# Patient Record
Sex: FEMALE | Race: WHITE | NOT HISPANIC OR LATINO | Employment: OTHER | URBAN - METROPOLITAN AREA
[De-identification: names, ages, dates, MRNs, and addresses within clinical notes are randomized per-mention and may not be internally consistent; named-entity substitution may affect disease eponyms.]

---

## 2020-05-07 ENCOUNTER — TELEPHONE (OUTPATIENT)
Dept: NEUROLOGY | Facility: MEDICAL CENTER | Age: 75
End: 2020-05-07

## 2020-05-07 ENCOUNTER — APPOINTMENT (OUTPATIENT)
Dept: RADIOLOGY | Facility: MEDICAL CENTER | Age: 75
DRG: 037 | End: 2020-05-07
Attending: EMERGENCY MEDICINE
Payer: MEDICARE

## 2020-05-07 ENCOUNTER — HOSPITAL ENCOUNTER (OUTPATIENT)
Dept: RADIOLOGY | Facility: MEDICAL CENTER | Age: 75
End: 2020-05-07
Payer: MEDICARE

## 2020-05-07 ENCOUNTER — APPOINTMENT (OUTPATIENT)
Dept: RADIOLOGY | Facility: MEDICAL CENTER | Age: 75
DRG: 037 | End: 2020-05-07
Attending: HOSPITALIST
Payer: MEDICARE

## 2020-05-07 ENCOUNTER — HOSPITAL ENCOUNTER (INPATIENT)
Facility: MEDICAL CENTER | Age: 75
LOS: 5 days | DRG: 037 | End: 2020-05-12
Attending: EMERGENCY MEDICINE | Admitting: HOSPITALIST
Payer: MEDICARE

## 2020-05-07 ENCOUNTER — APPOINTMENT (OUTPATIENT)
Dept: CARDIOLOGY | Facility: MEDICAL CENTER | Age: 75
DRG: 037 | End: 2020-05-07
Attending: HOSPITALIST
Payer: MEDICARE

## 2020-05-07 DIAGNOSIS — R42 VERTIGO: ICD-10-CM

## 2020-05-07 DIAGNOSIS — I63.9 ACUTE CVA (CEREBROVASCULAR ACCIDENT) (HCC): ICD-10-CM

## 2020-05-07 DIAGNOSIS — I63.9 CEREBROVASCULAR ACCIDENT (CVA), UNSPECIFIED MECHANISM (HCC): ICD-10-CM

## 2020-05-07 PROBLEM — Z72.0 TOBACCO ABUSE: Status: ACTIVE | Noted: 2020-05-07

## 2020-05-07 PROBLEM — E87.6 HYPOKALEMIA: Status: ACTIVE | Noted: 2020-05-07

## 2020-05-07 PROBLEM — I10 HTN (HYPERTENSION): Status: ACTIVE | Noted: 2020-05-07

## 2020-05-07 PROBLEM — I65.29 CAROTID STENOSIS: Status: ACTIVE | Noted: 2020-05-07

## 2020-05-07 PROBLEM — I67.1 INTRACRANIAL ANEURYSM: Status: ACTIVE | Noted: 2020-05-07

## 2020-05-07 PROBLEM — E78.5 HLD (HYPERLIPIDEMIA): Status: ACTIVE | Noted: 2020-05-07

## 2020-05-07 PROBLEM — E43 SEVERE PROTEIN-CALORIE MALNUTRITION (HCC): Status: ACTIVE | Noted: 2020-05-07

## 2020-05-07 LAB
ABO + RH BLD: NORMAL
ABO GROUP BLD: NORMAL
ALBUMIN SERPL BCP-MCNC: 2.7 G/DL (ref 3.2–4.9)
ALBUMIN/GLOB SERPL: 1.3 G/DL
ALP SERPL-CCNC: 80 U/L (ref 30–99)
ALT SERPL-CCNC: 11 U/L (ref 2–50)
ANION GAP SERPL CALC-SCNC: 8 MMOL/L (ref 7–16)
APTT PPP: 35.8 SEC (ref 24.7–36)
AST SERPL-CCNC: 11 U/L (ref 12–45)
BASOPHILS # BLD AUTO: 0.6 % (ref 0–1.8)
BASOPHILS # BLD: 0.04 K/UL (ref 0–0.12)
BILIRUB SERPL-MCNC: 0.7 MG/DL (ref 0.1–1.5)
BLD GP AB SCN SERPL QL: NORMAL
BUN SERPL-MCNC: 11 MG/DL (ref 8–22)
CALCIUM SERPL-MCNC: 7.1 MG/DL (ref 8.5–10.5)
CHLORIDE SERPL-SCNC: 115 MMOL/L (ref 96–112)
CO2 SERPL-SCNC: 20 MMOL/L (ref 20–33)
CREAT SERPL-MCNC: 0.53 MG/DL (ref 0.5–1.4)
EOSINOPHIL # BLD AUTO: 0.06 K/UL (ref 0–0.51)
EOSINOPHIL NFR BLD: 0.9 % (ref 0–6.9)
ERYTHROCYTE [DISTWIDTH] IN BLOOD BY AUTOMATED COUNT: 42.4 FL (ref 35.9–50)
GLOBULIN SER CALC-MCNC: 2.1 G/DL (ref 1.9–3.5)
GLUCOSE SERPL-MCNC: 86 MG/DL (ref 65–99)
HCT VFR BLD AUTO: 37.9 % (ref 37–47)
HGB BLD-MCNC: 12.3 G/DL (ref 12–16)
IMM GRANULOCYTES # BLD AUTO: 0.01 K/UL (ref 0–0.11)
IMM GRANULOCYTES NFR BLD AUTO: 0.2 % (ref 0–0.9)
INR PPP: 1.21 (ref 0.87–1.13)
LYMPHOCYTES # BLD AUTO: 1.63 K/UL (ref 1–4.8)
LYMPHOCYTES NFR BLD: 25.4 % (ref 22–41)
MCH RBC QN AUTO: 27.9 PG (ref 27–33)
MCHC RBC AUTO-ENTMCNC: 32.5 G/DL (ref 33.6–35)
MCV RBC AUTO: 85.9 FL (ref 81.4–97.8)
MONOCYTES # BLD AUTO: 0.51 K/UL (ref 0–0.85)
MONOCYTES NFR BLD AUTO: 8 % (ref 0–13.4)
NEUTROPHILS # BLD AUTO: 4.16 K/UL (ref 2–7.15)
NEUTROPHILS NFR BLD: 64.9 % (ref 44–72)
NRBC # BLD AUTO: 0 K/UL
NRBC BLD-RTO: 0 /100 WBC
PLATELET # BLD AUTO: 139 K/UL (ref 164–446)
PMV BLD AUTO: 10.4 FL (ref 9–12.9)
POTASSIUM SERPL-SCNC: 3.1 MMOL/L (ref 3.6–5.5)
PREALB SERPL-MCNC: 12.3 MG/DL (ref 18–38)
PROT SERPL-MCNC: 4.8 G/DL (ref 6–8.2)
PROTHROMBIN TIME: 15.6 SEC (ref 12–14.6)
RBC # BLD AUTO: 4.41 M/UL (ref 4.2–5.4)
RH BLD: NORMAL
SODIUM SERPL-SCNC: 143 MMOL/L (ref 135–145)
TROPONIN T SERPL-MCNC: 7 NG/L (ref 6–19)
WBC # BLD AUTO: 6.4 K/UL (ref 4.8–10.8)

## 2020-05-07 PROCEDURE — 84484 ASSAY OF TROPONIN QUANT: CPT

## 2020-05-07 PROCEDURE — 86900 BLOOD TYPING SEROLOGIC ABO: CPT

## 2020-05-07 PROCEDURE — 770020 HCHG ROOM/CARE - TELE (206)

## 2020-05-07 PROCEDURE — 99223 1ST HOSP IP/OBS HIGH 75: CPT | Mod: 25 | Performed by: HOSPITALIST

## 2020-05-07 PROCEDURE — 96365 THER/PROPH/DIAG IV INF INIT: CPT

## 2020-05-07 PROCEDURE — 71045 X-RAY EXAM CHEST 1 VIEW: CPT

## 2020-05-07 PROCEDURE — 84134 ASSAY OF PREALBUMIN: CPT

## 2020-05-07 PROCEDURE — 70450 CT HEAD/BRAIN W/O DYE: CPT

## 2020-05-07 PROCEDURE — 700111 HCHG RX REV CODE 636 W/ 250 OVERRIDE (IP): Performed by: HOSPITALIST

## 2020-05-07 PROCEDURE — 700117 HCHG RX CONTRAST REV CODE 255: Performed by: EMERGENCY MEDICINE

## 2020-05-07 PROCEDURE — 80053 COMPREHEN METABOLIC PANEL: CPT

## 2020-05-07 PROCEDURE — 700105 HCHG RX REV CODE 258: Performed by: HOSPITALIST

## 2020-05-07 PROCEDURE — 83036 HEMOGLOBIN GLYCOSYLATED A1C: CPT

## 2020-05-07 PROCEDURE — 36415 COLL VENOUS BLD VENIPUNCTURE: CPT

## 2020-05-07 PROCEDURE — 85610 PROTHROMBIN TIME: CPT

## 2020-05-07 PROCEDURE — 86901 BLOOD TYPING SEROLOGIC RH(D): CPT

## 2020-05-07 PROCEDURE — 99407 BEHAV CHNG SMOKING > 10 MIN: CPT | Performed by: HOSPITALIST

## 2020-05-07 PROCEDURE — 86850 RBC ANTIBODY SCREEN: CPT

## 2020-05-07 PROCEDURE — 85730 THROMBOPLASTIN TIME PARTIAL: CPT

## 2020-05-07 PROCEDURE — 70498 CT ANGIOGRAPHY NECK: CPT

## 2020-05-07 PROCEDURE — 94760 N-INVAS EAR/PLS OXIMETRY 1: CPT

## 2020-05-07 PROCEDURE — 70496 CT ANGIOGRAPHY HEAD: CPT

## 2020-05-07 PROCEDURE — 99358 PROLONG SERVICE W/O CONTACT: CPT | Mod: 25 | Performed by: HOSPITALIST

## 2020-05-07 PROCEDURE — 0042T CT-CEREBRAL PERFUSION ANALYSIS: CPT

## 2020-05-07 PROCEDURE — 99285 EMERGENCY DEPT VISIT HI MDM: CPT

## 2020-05-07 PROCEDURE — 85025 COMPLETE CBC W/AUTO DIFF WBC: CPT

## 2020-05-07 RX ORDER — LOSARTAN POTASSIUM 50 MG/1
50 TABLET ORAL DAILY
COMMUNITY

## 2020-05-07 RX ORDER — AMOXICILLIN 250 MG
2 CAPSULE ORAL 2 TIMES DAILY
Status: DISCONTINUED | OUTPATIENT
Start: 2020-05-07 | End: 2020-05-12 | Stop reason: HOSPADM

## 2020-05-07 RX ORDER — ONDANSETRON 2 MG/ML
4 INJECTION INTRAMUSCULAR; INTRAVENOUS EVERY 4 HOURS PRN
Status: DISCONTINUED | OUTPATIENT
Start: 2020-05-07 | End: 2020-05-12 | Stop reason: HOSPADM

## 2020-05-07 RX ORDER — METOPROLOL SUCCINATE 25 MG/1
25 TABLET, EXTENDED RELEASE ORAL DAILY
COMMUNITY

## 2020-05-07 RX ORDER — POLYETHYLENE GLYCOL 3350 17 G/17G
1 POWDER, FOR SOLUTION ORAL
Status: DISCONTINUED | OUTPATIENT
Start: 2020-05-07 | End: 2020-05-12 | Stop reason: HOSPADM

## 2020-05-07 RX ORDER — POTASSIUM CHLORIDE 7.45 MG/ML
10 INJECTION INTRAVENOUS
Status: COMPLETED | OUTPATIENT
Start: 2020-05-07 | End: 2020-05-08

## 2020-05-07 RX ORDER — ASPIRIN 300 MG/1
300 SUPPOSITORY RECTAL DAILY
Status: DISCONTINUED | OUTPATIENT
Start: 2020-05-08 | End: 2020-05-11

## 2020-05-07 RX ORDER — ASPIRIN 81 MG/1
324 TABLET, CHEWABLE ORAL DAILY
Status: DISCONTINUED | OUTPATIENT
Start: 2020-05-08 | End: 2020-05-11

## 2020-05-07 RX ORDER — VERAPAMIL HYDROCHLORIDE 120 MG/1
40 TABLET, FILM COATED ORAL DAILY
COMMUNITY

## 2020-05-07 RX ORDER — SODIUM CHLORIDE 9 MG/ML
INJECTION, SOLUTION INTRAVENOUS CONTINUOUS
Status: DISCONTINUED | OUTPATIENT
Start: 2020-05-07 | End: 2020-05-09

## 2020-05-07 RX ORDER — BISACODYL 10 MG
10 SUPPOSITORY, RECTAL RECTAL
Status: DISCONTINUED | OUTPATIENT
Start: 2020-05-07 | End: 2020-05-12 | Stop reason: HOSPADM

## 2020-05-07 RX ORDER — LABETALOL HYDROCHLORIDE 5 MG/ML
10 INJECTION, SOLUTION INTRAVENOUS EVERY 4 HOURS PRN
Status: DISCONTINUED | OUTPATIENT
Start: 2020-05-07 | End: 2020-05-09

## 2020-05-07 RX ORDER — ATORVASTATIN CALCIUM 40 MG/1
40 TABLET, FILM COATED ORAL EVERY EVENING
Status: DISCONTINUED | OUTPATIENT
Start: 2020-05-07 | End: 2020-05-12 | Stop reason: HOSPADM

## 2020-05-07 RX ORDER — HYDRALAZINE HYDROCHLORIDE 20 MG/ML
10 INJECTION INTRAMUSCULAR; INTRAVENOUS
Status: DISCONTINUED | OUTPATIENT
Start: 2020-05-07 | End: 2020-05-09

## 2020-05-07 RX ORDER — ONDANSETRON 4 MG/1
4 TABLET, ORALLY DISINTEGRATING ORAL EVERY 4 HOURS PRN
Status: DISCONTINUED | OUTPATIENT
Start: 2020-05-07 | End: 2020-05-12 | Stop reason: HOSPADM

## 2020-05-07 RX ORDER — ASPIRIN 325 MG
325 TABLET ORAL DAILY
Status: DISCONTINUED | OUTPATIENT
Start: 2020-05-08 | End: 2020-05-11

## 2020-05-07 RX ADMIN — IOHEXOL 40 ML: 350 INJECTION, SOLUTION INTRAVENOUS at 20:49

## 2020-05-07 RX ADMIN — POTASSIUM CHLORIDE 10 MEQ: 10 INJECTION, SOLUTION INTRAVENOUS at 23:57

## 2020-05-07 RX ADMIN — POTASSIUM CHLORIDE 10 MEQ: 10 INJECTION, SOLUTION INTRAVENOUS at 21:51

## 2020-05-07 RX ADMIN — SODIUM CHLORIDE: 9 INJECTION, SOLUTION INTRAVENOUS at 23:57

## 2020-05-07 RX ADMIN — IOHEXOL 80 ML: 350 INJECTION, SOLUTION INTRAVENOUS at 20:50

## 2020-05-07 SDOH — ECONOMIC STABILITY: FOOD INSECURITY: WITHIN THE PAST 12 MONTHS, THE FOOD YOU BOUGHT JUST DIDN'T LAST AND YOU DIDN'T HAVE MONEY TO GET MORE.: NEVER TRUE

## 2020-05-07 SDOH — ECONOMIC STABILITY: FOOD INSECURITY: WITHIN THE PAST 12 MONTHS, YOU WORRIED THAT YOUR FOOD WOULD RUN OUT BEFORE YOU GOT MONEY TO BUY MORE.: NEVER TRUE

## 2020-05-07 SDOH — HEALTH STABILITY: MENTAL HEALTH: HOW OFTEN DO YOU HAVE A DRINK CONTAINING ALCOHOL?: NEVER

## 2020-05-07 SDOH — ECONOMIC STABILITY: TRANSPORTATION INSECURITY
IN THE PAST 12 MONTHS, HAS THE LACK OF TRANSPORTATION KEPT YOU FROM MEDICAL APPOINTMENTS OR FROM GETTING MEDICATIONS?: NO

## 2020-05-07 SDOH — ECONOMIC STABILITY: TRANSPORTATION INSECURITY
IN THE PAST 12 MONTHS, HAS LACK OF TRANSPORTATION KEPT YOU FROM MEETINGS, WORK, OR FROM GETTING THINGS NEEDED FOR DAILY LIVING?: NO

## 2020-05-07 ASSESSMENT — LIFESTYLE VARIABLES
SUBSTANCE_ABUSE: 0
EVER FELT BAD OR GUILTY ABOUT YOUR DRINKING: NO
ALCOHOL_USE: NO
AVERAGE NUMBER OF DAYS PER WEEK YOU HAVE A DRINK CONTAINING ALCOHOL: 0
HAVE PEOPLE ANNOYED YOU BY CRITICIZING YOUR DRINKING: NO
HOW MANY TIMES IN THE PAST YEAR HAVE YOU HAD 5 OR MORE DRINKS IN A DAY: 0
CONSUMPTION TOTAL: NEGATIVE
HAVE YOU EVER FELT YOU SHOULD CUT DOWN ON YOUR DRINKING: NO
TOTAL SCORE: 0
ON A TYPICAL DAY WHEN YOU DRINK ALCOHOL HOW MANY DRINKS DO YOU HAVE: 0
EVER_SMOKED: YES
TOTAL SCORE: 0
TOTAL SCORE: 0
EVER HAD A DRINK FIRST THING IN THE MORNING TO STEADY YOUR NERVES TO GET RID OF A HANGOVER: NO

## 2020-05-07 ASSESSMENT — COPD QUESTIONNAIRES
DURING THE PAST 4 WEEKS HOW MUCH DID YOU FEEL SHORT OF BREATH: NONE/LITTLE OF THE TIME
COPD SCREENING SCORE: 5
DO YOU EVER COUGH UP ANY MUCUS OR PHLEGM?: YES, A FEW DAYS A WEEK OR MONTH
HAVE YOU SMOKED AT LEAST 100 CIGARETTES IN YOUR ENTIRE LIFE: YES
IN THE PAST 12 MONTHS DO YOU DO LESS THAN YOU USED TO BECAUSE OF YOUR BREATHING PROBLEMS: DISAGREE/UNSURE

## 2020-05-07 ASSESSMENT — ENCOUNTER SYMPTOMS
DIZZINESS: 1
DOUBLE VISION: 0
FEVER: 0
HEMOPTYSIS: 0
BRUISES/BLEEDS EASILY: 0
VOMITING: 0
HALLUCINATIONS: 0
FOCAL WEAKNESS: 0
NAUSEA: 0
PALPITATIONS: 0
WEAKNESS: 0
SHORTNESS OF BREATH: 0
CHILLS: 0
DEPRESSION: 0
COUGH: 0
ABDOMINAL PAIN: 0
MYALGIAS: 0
HEARTBURN: 0
EYE DISCHARGE: 0
SPEECH CHANGE: 1
FLANK PAIN: 0
BLURRED VISION: 0
SENSORY CHANGE: 1

## 2020-05-07 ASSESSMENT — PAIN SCALES - WONG BAKER: WONGBAKER_NUMERICALRESPONSE: DOESN'T HURT AT ALL

## 2020-05-07 ASSESSMENT — COGNITIVE AND FUNCTIONAL STATUS - GENERAL
STANDING UP FROM CHAIR USING ARMS: A LITTLE
SUGGESTED CMS G CODE MODIFIER DAILY ACTIVITY: CI
CLIMB 3 TO 5 STEPS WITH RAILING: A LITTLE
WALKING IN HOSPITAL ROOM: A LITTLE
DAILY ACTIVITIY SCORE: 23
MOBILITY SCORE: 19
SUGGESTED CMS G CODE MODIFIER MOBILITY: CK
MOVING FROM LYING ON BACK TO SITTING ON SIDE OF FLAT BED: A LITTLE
MOVING TO AND FROM BED TO CHAIR: A LITTLE
TOILETING: A LITTLE

## 2020-05-07 ASSESSMENT — PATIENT HEALTH QUESTIONNAIRE - PHQ9
2. FEELING DOWN, DEPRESSED, IRRITABLE, OR HOPELESS: NOT AT ALL
1. LITTLE INTEREST OR PLEASURE IN DOING THINGS: NOT AT ALL
SUM OF ALL RESPONSES TO PHQ9 QUESTIONS 1 AND 2: 0

## 2020-05-07 ASSESSMENT — FIBROSIS 4 INDEX: FIB4 SCORE: 1.81

## 2020-05-08 ENCOUNTER — APPOINTMENT (OUTPATIENT)
Dept: RADIOLOGY | Facility: MEDICAL CENTER | Age: 75
DRG: 037 | End: 2020-05-08
Attending: HOSPITALIST
Payer: MEDICARE

## 2020-05-08 ENCOUNTER — APPOINTMENT (OUTPATIENT)
Dept: RADIOLOGY | Facility: MEDICAL CENTER | Age: 75
DRG: 037 | End: 2020-05-08
Attending: PSYCHIATRY & NEUROLOGY
Payer: MEDICARE

## 2020-05-08 LAB
ALBUMIN SERPL BCP-MCNC: 3.3 G/DL (ref 3.2–4.9)
ALBUMIN/GLOB SERPL: 1.5 G/DL
ALP SERPL-CCNC: 100 U/L (ref 30–99)
ALT SERPL-CCNC: 13 U/L (ref 2–50)
ANION GAP SERPL CALC-SCNC: 8 MMOL/L (ref 7–16)
AST SERPL-CCNC: 16 U/L (ref 12–45)
BASOPHILS # BLD AUTO: 0.6 % (ref 0–1.8)
BASOPHILS # BLD: 0.04 K/UL (ref 0–0.12)
BILIRUB SERPL-MCNC: 1 MG/DL (ref 0.1–1.5)
BUN SERPL-MCNC: 11 MG/DL (ref 8–22)
CALCIUM SERPL-MCNC: 9 MG/DL (ref 8.5–10.5)
CHLORIDE SERPL-SCNC: 104 MMOL/L (ref 96–112)
CHOLEST SERPL-MCNC: 165 MG/DL (ref 100–199)
CO2 SERPL-SCNC: 24 MMOL/L (ref 20–33)
CREAT SERPL-MCNC: 0.75 MG/DL (ref 0.5–1.4)
EOSINOPHIL # BLD AUTO: 0.1 K/UL (ref 0–0.51)
EOSINOPHIL NFR BLD: 1.4 % (ref 0–6.9)
ERYTHROCYTE [DISTWIDTH] IN BLOOD BY AUTOMATED COUNT: 43.4 FL (ref 35.9–50)
EST. AVERAGE GLUCOSE BLD GHB EST-MCNC: 108 MG/DL
GLOBULIN SER CALC-MCNC: 2.2 G/DL (ref 1.9–3.5)
GLUCOSE SERPL-MCNC: 96 MG/DL (ref 65–99)
HBA1C MFR BLD: 5.4 % (ref 0–5.6)
HCT VFR BLD AUTO: 42.1 % (ref 37–47)
HDLC SERPL-MCNC: 36 MG/DL
HGB BLD-MCNC: 13.8 G/DL (ref 12–16)
IMM GRANULOCYTES # BLD AUTO: 0.02 K/UL (ref 0–0.11)
IMM GRANULOCYTES NFR BLD AUTO: 0.3 % (ref 0–0.9)
LDLC SERPL CALC-MCNC: 107 MG/DL
LV EJECT FRACT  99904: 70
LYMPHOCYTES # BLD AUTO: 2.12 K/UL (ref 1–4.8)
LYMPHOCYTES NFR BLD: 29.2 % (ref 22–41)
MCH RBC QN AUTO: 28 PG (ref 27–33)
MCHC RBC AUTO-ENTMCNC: 32.8 G/DL (ref 33.6–35)
MCV RBC AUTO: 85.6 FL (ref 81.4–97.8)
MONOCYTES # BLD AUTO: 0.72 K/UL (ref 0–0.85)
MONOCYTES NFR BLD AUTO: 9.9 % (ref 0–13.4)
NEUTROPHILS # BLD AUTO: 4.26 K/UL (ref 2–7.15)
NEUTROPHILS NFR BLD: 58.6 % (ref 44–72)
NRBC # BLD AUTO: 0 K/UL
NRBC BLD-RTO: 0 /100 WBC
PLATELET # BLD AUTO: 158 K/UL (ref 164–446)
PMV BLD AUTO: 10.2 FL (ref 9–12.9)
POTASSIUM SERPL-SCNC: 3.8 MMOL/L (ref 3.6–5.5)
PROT SERPL-MCNC: 5.5 G/DL (ref 6–8.2)
RBC # BLD AUTO: 4.92 M/UL (ref 4.2–5.4)
SODIUM SERPL-SCNC: 136 MMOL/L (ref 135–145)
TRIGL SERPL-MCNC: 108 MG/DL (ref 0–149)
WBC # BLD AUTO: 7.3 K/UL (ref 4.8–10.8)

## 2020-05-08 PROCEDURE — 36415 COLL VENOUS BLD VENIPUNCTURE: CPT

## 2020-05-08 PROCEDURE — 92610 EVALUATE SWALLOWING FUNCTION: CPT

## 2020-05-08 PROCEDURE — 93306 TTE W/DOPPLER COMPLETE: CPT | Mod: 26 | Performed by: INTERNAL MEDICINE

## 2020-05-08 PROCEDURE — 99222 1ST HOSP IP/OBS MODERATE 55: CPT | Mod: GC | Performed by: PSYCHIATRY & NEUROLOGY

## 2020-05-08 PROCEDURE — 93306 TTE W/DOPPLER COMPLETE: CPT

## 2020-05-08 PROCEDURE — 700111 HCHG RX REV CODE 636 W/ 250 OVERRIDE (IP): Performed by: HOSPITALIST

## 2020-05-08 PROCEDURE — 700102 HCHG RX REV CODE 250 W/ 637 OVERRIDE(OP): Performed by: HOSPITALIST

## 2020-05-08 PROCEDURE — 97165 OT EVAL LOW COMPLEX 30 MIN: CPT

## 2020-05-08 PROCEDURE — 93880 EXTRACRANIAL BILAT STUDY: CPT

## 2020-05-08 PROCEDURE — 770020 HCHG ROOM/CARE - TELE (206)

## 2020-05-08 PROCEDURE — 85025 COMPLETE CBC W/AUTO DIFF WBC: CPT

## 2020-05-08 PROCEDURE — 99233 SBSQ HOSP IP/OBS HIGH 50: CPT | Performed by: HOSPITALIST

## 2020-05-08 PROCEDURE — 80053 COMPREHEN METABOLIC PANEL: CPT

## 2020-05-08 PROCEDURE — 70551 MRI BRAIN STEM W/O DYE: CPT

## 2020-05-08 PROCEDURE — 97162 PT EVAL MOD COMPLEX 30 MIN: CPT

## 2020-05-08 PROCEDURE — 700105 HCHG RX REV CODE 258: Performed by: HOSPITALIST

## 2020-05-08 PROCEDURE — 80061 LIPID PANEL: CPT

## 2020-05-08 PROCEDURE — A9270 NON-COVERED ITEM OR SERVICE: HCPCS | Performed by: HOSPITALIST

## 2020-05-08 RX ORDER — ACETAMINOPHEN 325 MG/1
650 TABLET ORAL EVERY 6 HOURS PRN
Status: DISCONTINUED | OUTPATIENT
Start: 2020-05-08 | End: 2020-05-12 | Stop reason: HOSPADM

## 2020-05-08 RX ADMIN — ATORVASTATIN CALCIUM 40 MG: 40 TABLET, FILM COATED ORAL at 17:24

## 2020-05-08 RX ADMIN — POTASSIUM CHLORIDE 10 MEQ: 10 INJECTION, SOLUTION INTRAVENOUS at 01:29

## 2020-05-08 RX ADMIN — ASPIRIN 325 MG: 325 TABLET, FILM COATED ORAL at 09:59

## 2020-05-08 RX ADMIN — SENNOSIDES AND DOCUSATE SODIUM 2 TABLET: 8.6; 5 TABLET ORAL at 17:24

## 2020-05-08 RX ADMIN — NICOTINE 7 MG: 7 PATCH TRANSDERMAL at 04:52

## 2020-05-08 RX ADMIN — SODIUM CHLORIDE: 9 INJECTION, SOLUTION INTRAVENOUS at 07:55

## 2020-05-08 RX ADMIN — POTASSIUM CHLORIDE 10 MEQ: 10 INJECTION, SOLUTION INTRAVENOUS at 03:16

## 2020-05-08 ASSESSMENT — ENCOUNTER SYMPTOMS
SENSORY CHANGE: 0
DEPRESSION: 0
ORTHOPNEA: 0
NECK PAIN: 0
MYALGIAS: 0
SPUTUM PRODUCTION: 0
WEAKNESS: 1
PALPITATIONS: 0
HEARTBURN: 0
FEVER: 0
BACK PAIN: 0
DIZZINESS: 1
PHOTOPHOBIA: 0
BLURRED VISION: 0
NERVOUS/ANXIOUS: 0
DOUBLE VISION: 0
WEIGHT LOSS: 0
CHILLS: 0
ABDOMINAL PAIN: 0
VOMITING: 0
CLAUDICATION: 0
HEMOPTYSIS: 0
NAUSEA: 0
COUGH: 0
EYE PAIN: 0

## 2020-05-08 ASSESSMENT — COGNITIVE AND FUNCTIONAL STATUS - GENERAL
WALKING IN HOSPITAL ROOM: A LITTLE
MOVING TO AND FROM BED TO CHAIR: A LITTLE
DAILY ACTIVITIY SCORE: 23
MOBILITY SCORE: 19
SUGGESTED CMS G CODE MODIFIER DAILY ACTIVITY: CI
SUGGESTED CMS G CODE MODIFIER MOBILITY: CK
STANDING UP FROM CHAIR USING ARMS: A LITTLE
DRESSING REGULAR LOWER BODY CLOTHING: A LITTLE
MOVING FROM LYING ON BACK TO SITTING ON SIDE OF FLAT BED: A LITTLE
CLIMB 3 TO 5 STEPS WITH RAILING: A LITTLE

## 2020-05-08 ASSESSMENT — GAIT ASSESSMENTS
DEVIATION: DECREASED BASE OF SUPPORT;OTHER (COMMENT)
DISTANCE (FEET): 110
GAIT LEVEL OF ASSIST: MINIMAL ASSIST

## 2020-05-08 ASSESSMENT — ACTIVITIES OF DAILY LIVING (ADL): TOILETING: INDEPENDENT

## 2020-05-08 NOTE — PROGRESS NOTES
[facility] Medicine Daily Progress Note    Date of Service  5/8/2020    Chief Complaint  74 y.o. female admitted 5/7/2020 with dizziness      Interval Problem Update  aWaiting MRI of the head    She is still dizzy    She is afebrile    Denies any headache    Carotid ultrasound shows greater than 70% of the left internal carotid artery      Consultants/Specialty  Neurology    Code Status    Full code  Disposition  Pending    Review of Systems  Review of Systems   Constitutional: Positive for malaise/fatigue. Negative for chills, fever and weight loss.   HENT: Negative for ear discharge, ear pain, hearing loss and tinnitus.    Eyes: Negative for blurred vision, double vision, photophobia and pain.   Respiratory: Negative for cough, hemoptysis and sputum production.    Cardiovascular: Negative for chest pain, palpitations, orthopnea and claudication.   Gastrointestinal: Negative for abdominal pain, heartburn, nausea and vomiting.   Genitourinary: Negative for dysuria, frequency and urgency.   Musculoskeletal: Negative for back pain, joint pain, myalgias and neck pain.   Neurological: Positive for dizziness and weakness. Negative for sensory change.   Psychiatric/Behavioral: Negative for depression and suicidal ideas. The patient is not nervous/anxious.         Physical Exam  Temp:  [36.7 °C (98 °F)-36.8 °C (98.2 °F)] 36.7 °C (98 °F)  Pulse:  [45-57] 57  Resp:  [16-26] 20  BP: (145-203)/(59-93) 145/59  SpO2:  [92 %-97 %] 94 %    Physical Exam  Constitutional:       General: She is not in acute distress.     Appearance: She is not ill-appearing, toxic-appearing or diaphoretic.      Comments: Thin   HENT:      Head: Normocephalic and atraumatic.      Nose: No rhinorrhea.      Mouth/Throat:      Pharynx: No posterior oropharyngeal erythema.   Eyes:      General: No scleral icterus.     Extraocular Movements: Extraocular movements intact.      Pupils: Pupils are equal, round, and reactive to light.   Neck:       Musculoskeletal: Normal range of motion and neck supple.   Cardiovascular:      Rate and Rhythm: Normal rate and regular rhythm.      Pulses: Normal pulses.      Heart sounds: No murmur.   Pulmonary:      Effort: Pulmonary effort is normal. No respiratory distress.      Breath sounds: No stridor. No wheezing, rhonchi or rales.   Chest:      Chest wall: No tenderness.   Abdominal:      General: Abdomen is flat. There is no distension.      Palpations: There is no mass.      Tenderness: There is no abdominal tenderness. There is no guarding or rebound.      Hernia: No hernia is present.   Musculoskeletal: Normal range of motion.         General: No swelling, tenderness, deformity or signs of injury.      Right lower leg: No edema.      Left lower leg: No edema.   Skin:     General: Skin is warm.      Capillary Refill: Capillary refill takes 2 to 3 seconds.      Findings: No bruising or lesion.   Neurological:      General: No focal deficit present.      Mental Status: She is alert and oriented to person, place, and time.   Psychiatric:      Comments: Depressed mood         Fluids  No intake or output data in the 24 hours ending 05/08/20 0851    Laboratory  Recent Labs     05/07/20 2036 05/08/20  0407   WBC 6.4 7.3   RBC 4.41 4.92   HEMOGLOBIN 12.3 13.8   HEMATOCRIT 37.9 42.1   MCV 85.9 85.6   MCH 27.9 28.0   MCHC 32.5* 32.8*   RDW 42.4 43.4   PLATELETCT 139* 158*   MPV 10.4 10.2     Recent Labs     05/07/20 2036 05/08/20  0407   SODIUM 143 136   POTASSIUM 3.1* 3.8   CHLORIDE 115* 104   CO2 20 24   GLUCOSE 86 96   BUN 11 11   CREATININE 0.53 0.75   CALCIUM 7.1* 9.0     Recent Labs     05/07/20 2036   APTT 35.8   INR 1.21*         Recent Labs     05/08/20  0407   TRIGLYCERIDE 108   HDL 36*   *       Imaging  EC-ECHOCARDIOGRAM COMPLETE W/O CONT   Final Result      US-CAROTID DOPPLER BILAT   Final Result      OUTSIDE IMAGES-CT HEAD   Final Result      DX-CHEST-PORTABLE (1 VIEW)   Final Result      No acute  cardiopulmonary abnormality.      CT-CTA NECK WITH & W/O-POST PROCESSING   Final Result   Addendum 1 of 1   Neuro interventional radiology consult is advised for potential aneurysm    intervention.      Final      CT-CTA HEAD WITH & W/O-POST PROCESS   Final Result      1.  No large vessel occlusion or high-grade stenosis.   2.  A 3.5 mm aneurysm at the left MCA trifurcation.      CT-CEREBRAL PERFUSION ANALYSIS   Final Result      1.  Cerebral blood flow less than 30% likely representing completed infarct = 0 mL.      2.  T Max more than 6 seconds likely representing combination of completed infarct and ischemia = 0 mL.      3.  Mismatched volume likely representing ischemic brain/penumbra = 0 mL.      4.  Please note that the cerebral perfusion was performed on the limited brain tissue around the basal ganglia region. Infarct/ischemia outside the CT perfusion sections can be missed in this study.      CT-HEAD W/O   Final Result      1. No CT evidence of acute infarct, hemorrhage or mass.   2. Mild global parenchymal atrophy. Chronic small vessel ischemic changes.      MR-BRAIN-W/O    (Results Pending)        Assessment/Plan  * CVA (cerebral vascular accident) (HCC)  Assessment & Plan  Dizziness with persistent vertigo   Admit to neuro   Tele monitoring  Echocardiogram   CTA head and neck with evidence of left carotid stenosis 50-70%, small aneurysm on CTA head  Mri brain w/o ordered  Echocardiogram  A1c normal   lipid panel ldl 107  Statin and asa   Permissive htn   Neuro consulted    Severe protein-calorie malnutrition (HCC)- (present on admission)  Assessment & Plan  Protein supplementation  Nutrition consultation     Hypokalemia- (present on admission)  Assessment & Plan  Replace and recheck     Intracranial aneurysm- (present on admission)  Assessment & Plan  will need follow up    Tobacco abuse  Assessment & Plan  Previously advised to stop smoking    HLD (hyperlipidemia)- (present on admission)  Assessment &  Plan  High dose statin therapy       Carotid stenosis- (present on admission)  Assessment & Plan  Left ICA stenosis  Await MRI head and consult vascular surgery if needed     HTN (hypertension)- (present on admission)  Assessment & Plan  Permissive htn as clinically appropriate        VTE prophylaxis: scd    Check am cbc, bmp

## 2020-05-08 NOTE — PROGRESS NOTES
0615: When completing MRI screening sheet, patient informed this RN she recently had surgery on L retina, patient unsure of what surgery was performed and if anything was implanted. Per patient, daughter in law Rachel will be able to inform nursing about surgery. Rachel's number found in paper chart, number added to emergency contact list. Will pass onto day shift RN to contact Rachel this AM.

## 2020-05-08 NOTE — CARE PLAN
Problem: Nutritional:  Goal: Achieve adequate nutritional intake  Description: Patient will consume 50% of meals/supplements   Outcome: NOT MET     See RD note

## 2020-05-08 NOTE — PROGRESS NOTES
1000: Report received from ASHWIN Sanchez    1020: Assumed care of pt with ASHWIN Bates. Pt retrieved from ED. Transported to floor via gurney. Pt reporting pain at IV site. Denies numbness and tingling. Reporting dizziness and blurred vision that has been occurring for the last month but is worse today. Reports daughter in law took blood pressure earlier in day at home, BP was elevated, prompting them to go to the hospital.     1035: Ambulated from rney to bed with slightly unsteady gait, reporting dizziness continues. Dysphagia screen failed for slurred speech. Pt oriented to room, use of call light, and fall risk. Pt understanding of teaching. Call light and belongings within reach, nonskid socks on, bed alarm on, SCDs on, all needs met at this time.

## 2020-05-08 NOTE — DIETARY
"Nutrition services: Day 1 of admit.  Yeny Kirk is a 74 y.o. female with admitting DX of Dysarthria, CVA     Consult received for low BMI    Phone call to pt, no answer. Pt does not have cell phone with her per family who answered cell when called. Visit with pt at bedside: pt reports  lb. Pt reports fair appetite, eating 2 small meals/day PTA x1 month. Pt reports eating all of lunch today. Pt agreeable to supplement (Boost Plus or Magic Cup) with meals to encourage weight gain. Pt reports drinking Boost type shakes at home when daughter brings them. Pt reports noticeable muscle loss in arms (thin/flat).      Assessment:  Height: 162.6 cm (5' 4\")  Weight: 49.1 kg (108 lb 3.9 oz)  Body mass index is 18.58 kg/m²., BMI classification: Normal  Diet/Intake: Puree (Level 4) with thick liquids (Level 2). 50-75% breakfast today    Evaluation:   1. Negative nutrition admit screen for poor PO and weight loss  2. PMH: Severe PCM per hospital problems  3. Labs: PAB 12.3 (5/7)  4. Meds: Lipitor   5. Fluids: NS @ 50 ml/hr  6. Noted severe fat/severe muscle loss (orbital/temporal/dorsal regions)    Malnutrition Risk: Pt with severe malnutrition evident by poor PO intake <75% x1 month, severe fat/severe muscle loss (orbital/temporal/dorsal regions, thin/flat limbs).     Recommendations/Plan:  1. Boost Plus or magic cup with meals   2. Encourage intake of meals  3. Document intake of all meals as % taken in ADL's to provide interdisciplinary communication across all shifts.   4. Monitor weight.  5. Nutrition rep will continue to see patient for ongoing meal and snack preferences.     RD following         "

## 2020-05-08 NOTE — ED TRIAGE NOTES
Yeny Kirk  74 y.o.  female  Chief Complaint   Patient presents with   • Possible Stroke     brought in by ambulance transferred from [facility] for possible stroke. patient c/o dizziness and blurred vision x month but today having trouble walking due to severe dizziness. + nausea. right facial dropp and having hard time articulating. =  and strong.

## 2020-05-08 NOTE — THERAPY
"Occupational Therapy Evaluation completed.   Functional Status:   Currently min A with ADLs and ADL transfers. Pt currently limited by decreased functional mobility,  Balance, which are affecting pt's ability to complete ADLs/IADLs at baseline. Pt would benefit from OT services in the acute care setting to maximize functional recovery.   Plan of Care: Will benefit from Occupational Therapy 4 times per week  Discharge Recommendations:  Recommend post-acute placement for additional occupational therapy services prior to discharge home.  See \"Rehab Therapy-Acute\" Patient Summary Report for complete documentation.    "

## 2020-05-08 NOTE — ED NOTES
Patient brought to room via gurney from CT. RN bedside report received from ASHWIN Real. Patient's blood pressure is elevated, ERP aware. Neuro exam is unremarkable other than very mild dysarthria and dizziness. No needs at this time.

## 2020-05-08 NOTE — CARE PLAN
Problem: Communication  Goal: The ability to communicate needs accurately and effectively will improve  Outcome: PROGRESSING AS EXPECTED  Intervention: [place] patient and significant other/support system to call light to alert staff of needs  Flowsheets (Taken 5/8/2020 1457)  Oriented to:: All of the Following : Location of Bathroom, Visiting Policy, Unit Routine, Call Light and Bedside Controls, Bedside Rail Policy, Smoking Policy, Rights and Responsibilities, Bedside Report, and Patient Education Notebook  Note: Pt A&Ox4, calls appropriately using call light and is able to make needs known to staff. Pt oriented to room and call light. Hourly rounding in place.        Problem: Safety  Goal: Will remain free from falls  Outcome: PROGRESSING AS EXPECTED  Intervention: Implement fall precautions  Flowsheets (Taken 5/8/2020 0800)  Environmental Precautions:   Treaded Slipper Socks on Patient   Personal Belongings, Wastebasket, Call Bell etc. in Easy Reach   Transferred to Stronger Side   Report Given to Other Health Care Providers Regarding Fall Risk   Bed in Low Position   Communication Sign for Patients & Families   Mobility Assessed & Appropriate Sign Placed  Note: Pt is a moderate fall risk. Fall precautions in place. Bed locked and in lowest position. Bed alarm on, call light within reach, non skid socks in place.

## 2020-05-08 NOTE — THERAPY
"Speech Language Therapy Clinical Swallow Evaluation completed.    Functional Status: Patient was seen on this date for a clinical swallow evaluation. Patient was AAOx4 with dysarthric speech characterized by reduced rate. Oral motor examination revealed minimal right facial droop, slow and reduced lingual movements, and mild-moderately impaired diadochokinetic rates. PO trials were administered and consisted of single ice chips, MTL, pudding, thin liquids, and soft solids. Onset of swallow trigger was minimally delayed and laryngeal elevation adequate to palpation. Patient had mild anterior bolus loss x1 with MTL and ~50% of the time with trials of thin liquids concerning for adequate bolus control. No overt s/sx of aspiration appreciated this session. Mastication prolonged with soft peaches and resulted in mild-moderate oral residue.     Recommendations - Diet: At this time, recommend initiation of a Level 4 (pureed), Level 2 (mildly think liquid) diet given direct supervision. OK for patient to have single sips of un-thickened water between meals. MRI pending - SLP following.                             Strategies: Direct supervision during meals, No Straws and Head of Bed at 90 Degrees                            Medication Administration: Whole in puree/pudding     Plan of Care: Will benefit from Speech Therapy 5 times per week    Post-Acute Therapy: Recommend inpatient transitional care services for continued speech therapy services.      See \"Rehab Therapy-Acute\" Patient Summary Report for complete documentation. Thank you for the consult.           "

## 2020-05-08 NOTE — THERAPY
"Physical Therapy Evaluation completed.   Bed Mobility:  Supine to Sit: Modified Independent  Transfers: Sit to Stand: Contact Guard Assist  Gait: Level Of Assist: Minimal Assist (min A without covering L eye; CGA when L eye covered) with No Equipment Needed       Plan of Care: Will benefit from Physical Therapy 4 times per week  Discharge Recommendations: Equipment: Will Continue to Assess for Equipment Needs. See below    Pt presents to PT with impaired balance and gait associated with medical co-morbidities. Her current visual distrubances are notably impacting her gait mechanics and balance. She does improve her gait mechanics and balance when covering L eye to reduce \"double vision\" and would recommend use of eye patch at L eye during OOB activity given current presentation. However she does demonstrate abnormal gait mechanics (even with eye covering) and has almost scissoring gait at times and is currently at increased risk for falls without additional PT and/or appropriate compensatory strategies. Currently would recommend continued skilled PT/placement at time of medical clearance for optimal functional recovery though anticiptae that pt will quickly progress with habituation to L eye covering (or resolution of current symptoms) and increased OOB activity with both PT and staff. WIll continue to visit and assist with dc planning.       See \"Rehab Therapy-Acute\" Patient Summary Report for complete documentation.     "

## 2020-05-08 NOTE — PROGRESS NOTES
Monitor Summary: SR 49-54, TX .20, QRS .10, QT .44 with rare PVCs and heart rate as low as 40bpm per strip from monitor room.

## 2020-05-08 NOTE — PROGRESS NOTES
2 RN skin check performed with ASHWIN Dyson.  Generalized bruising/scabbing, skin fragile.   No skin issues noted.

## 2020-05-08 NOTE — PROGRESS NOTES
74-year-old female with history of slurred speech, dizziness and right-sided facial droop. The CT angiogram of the head demonstrates an approximately 2 to 3 mm sized left middle cerebral bifurcation aneurysm. The superior M2 segment is seen arising from the base of the aneurysm. The aneurysm hs a wide neck. There is also an approximately 70% stenosis at the origin of the right internal carotid artery.              Neuro interventional recommendations:  Left middle cerebral artery aneurysm:  The 5 year risk of rupture rate of this aneurysm is very low. Observation is recommended. One-year CT angiogram of the head is recommended to ensure the stability.    Left internal carotid artery origin stenosis:    Vascular surgery consultation is recommended for carotid revascularization. Since the carotid stenting procedure needs Plavix and aspirin for 6 months, carotid endarterectomy may be less risky than stenting  in the presence of cerebral aneurysm.

## 2020-05-08 NOTE — CONSULTS
Called by Dr. Quintana     73 y/o male presenting as transfer from [facility] for dizziness. Had acute onset vertigo at rest and severe nystagmus. Patient was transferred here for concern for possible stroke. CTA Head/Neck were performed. No LVO on CTA head but moderate to severe stenosis of the LICA on the CTA neck. According to Dr. Quintana patient is having some slurred speech or possibly aphasia. In this regard admission to the hospital would be warranted, and an MR Brain W/O CST as well as a carotid doppler would be indicated. If the carotid doppler confirms the degree of stenosis, and the MRI shows infarction then this would constitute a symptomatic carotid and vascular surgery may be indicated.     Plan:   -Admission to hospital.  -MR Brain W/O CST  -Carotid doppler  -If Carotid doppler confirms moderate-severe stenosis and MR Brain W/O CST shows infarction then consult vascular surgery.  -Full neuro consult in the morning.

## 2020-05-08 NOTE — ED NOTES
Medicated patient per MAR, held PO meds pending swallow evaluation.    Called report to ASHWIN Dyson for the patient going to room S193-21

## 2020-05-08 NOTE — H&P
Hospital Medicine History & Physical Note    Date of Service  5/7/2020    Primary Care Physician  No primary care provider on file.    Code Status  full    Chief Complaint  Chief Complaint   Patient presents with   • Possible Stroke     brought in by ambulance transferred from [facility] for possible stroke. patient c/o dizziness and blurred vision x month but today having trouble walking due to severe dizziness. + nausea. right facial dropp and having hard time articulating. =  and strong.       History of Presenting Illness  74 y.o. female who presented 5/7/2020 with past medical history of hypertension, tobacco abuse, history of retinal hemorrhage of the left eye presents with dizziness.  This patient presents with severe dizziness.  She presented to the outside hospital with significant dizziness and uncontrolled blood pressure.  Felt like she was falling backwards whenever she moves or changes her head position.  She did have some mild nausea and had difficulty walking with ataxia.  She also started developing dysarthria at the outside hospital concerning for possible CVA.  The patient woke up with difficulty talking especially with the right side of her mouth and it has not improved substantially otherwise she has no known alleviating or exacerbating factors to her symptoms.  To be admitted to the hospital for further CVA rule out.    Per review of outside hospital records patient's blood pressure 170/62 respiratory rate 20 heart rate 57 temperature 36.5 °C WBC count 8.6 hemoglobin 15.8 platelet count 178 sodium 140 potassium 3.9 chloride 104 CO2 25 alkaline phosphatase 114 AST 20 ALT 15 BUN 16 glucose 103 creatinine 1.7 calcium 9.7 albumin 4.2 CT of the head images demonstrate there is no acute intra-axial or extra-axial hemorrhage.  There is no shift of midline structures.  The bony calvarium is intact.  No intracranial changes.        Review of Systems  Review of Systems   Constitutional:  Positive for malaise/fatigue. Negative for chills and fever.   HENT: Negative for congestion, hearing loss and tinnitus.    Eyes: Negative for blurred vision, double vision and discharge.   Respiratory: Negative for cough, hemoptysis and shortness of breath.    Cardiovascular: Negative for chest pain, palpitations and leg swelling.   Gastrointestinal: Negative for abdominal pain, heartburn, nausea and vomiting.   Genitourinary: Negative for dysuria and flank pain.   Musculoskeletal: Negative for joint pain and myalgias.   Skin: Negative for rash.   Neurological: Positive for dizziness, sensory change and speech change. Negative for focal weakness and weakness.   Endo/Heme/Allergies: Negative for environmental allergies. Does not bruise/bleed easily.   Psychiatric/Behavioral: Negative for depression, hallucinations and substance abuse.       Past Medical History   has a past medical history of Hypertension.    Surgical History  Reviewed and not pertinent    Family History  Reviewed and not pertinent     Social History   reports that she has been smoking cigarettes. She has been smoking about 0.30 packs per day. She has never used smokeless tobacco. She reports that she does not drink alcohol or use drugs.    Allergies  Allergies   Allergen Reactions   • Dust Mite Extract    • Pollen Extract        Medications  None       Physical Exam  Temp:  [36.7 °C (98.1 °F)] 36.7 °C (98.1 °F)  Pulse:  [56] 56  Resp:  [16] 16  BP: (198)/(87) 198/87  SpO2:  [95 %] 95 %    Physical Exam  Vitals signs reviewed.   Constitutional:       Appearance: Normal appearance.   HENT:      Head: Normocephalic and atraumatic.      Nose: No congestion.      Mouth/Throat:      Mouth: Mucous membranes are dry.   Eyes:      Extraocular Movements: Extraocular movements intact.      Pupils: Pupils are equal, round, and reactive to light.   Neck:      Musculoskeletal: Normal range of motion and neck supple. No muscular tenderness.   Cardiovascular:       Rate and Rhythm: Normal rate and regular rhythm.      Pulses: Normal pulses.      Heart sounds: Normal heart sounds. No murmur.   Pulmonary:      Effort: Pulmonary effort is normal. No respiratory distress.      Breath sounds: Normal breath sounds. No stridor.   Abdominal:      General: Bowel sounds are normal. There is no distension.      Palpations: Abdomen is soft.      Tenderness: There is no abdominal tenderness.   Musculoskeletal:         General: No swelling or deformity.   Skin:     General: Skin is warm and dry.      Capillary Refill: Capillary refill takes 2 to 3 seconds.   Neurological:      General: No focal deficit present.      Mental Status: She is alert and oriented to person, place, and time.      Comments: Dysarthria    Psychiatric:         Mood and Affect: Mood normal.         Behavior: Behavior normal.         Thought Content: Thought content normal.         Judgment: Judgment normal.         Laboratory:  Recent Labs     05/07/20 2036   WBC 6.4   RBC 4.41   HEMOGLOBIN 12.3   HEMATOCRIT 37.9   MCV 85.9   MCH 27.9   MCHC 32.5*   RDW 42.4   PLATELETCT 139*   MPV 10.4         No results for input(s): ALTSGPT, ASTSGOT, ALKPHOSPHAT, TBILIRUBIN, DBILIRUBIN, GAMMAGT, AMYLASE, LIPASE, ALB, PREALBUMIN, GLUCOSE in the last 72 hours.  Recent Labs     05/07/20 2036   APTT 35.8   INR 1.21*     No results for input(s): NTPROBNP in the last 72 hours.      No results for input(s): TROPONINT in the last 72 hours.    Imaging:  CT-CTA NECK WITH & W/O-POST PROCESSING   Final Result      1.  Moderate to severe, 50-70% stenosis of the left ICA origin.   2.  No other high-grade stenosis. No aneurysm or dissection.      CT-CTA HEAD WITH & W/O-POST PROCESS   Final Result      1.  No large vessel occlusion or high-grade stenosis.   2.  A 3.5 mm aneurysm at the left MCA trifurcation.      CT-CEREBRAL PERFUSION ANALYSIS   Final Result      1.  Cerebral blood flow less than 30% likely representing completed infarct = 0 mL.       2.  T Max more than 6 seconds likely representing combination of completed infarct and ischemia = 0 mL.      3.  Mismatched volume likely representing ischemic brain/penumbra = 0 mL.      4.  Please note that the cerebral perfusion was performed on the limited brain tissue around the basal ganglia region. Infarct/ischemia outside the CT perfusion sections can be missed in this study.      CT-HEAD W/O   Final Result      1. No CT evidence of acute infarct, hemorrhage or mass.   2. Mild global parenchymal atrophy. Chronic small vessel ischemic changes.      DX-CHEST-PORTABLE (1 VIEW)    (Results Pending)         Assessment/Plan:  Anticipate greater than 2 midnight hospitalization     * CVA (cerebral vascular accident) (HCC)  Assessment & Plan  Dizziness with persistent vertigo   Admit to neuro   Tele monitoring  Echocardiogram   CTA head and neck with evidence of left carotid stenosis 50-70%, small aneurysm on CTA head  Mri brain w/o ordered  Echocardiogram  A1c, lipid panel   Statin and asa   Permissive htn   Neuro consulted plan for eval in am     Severe protein-calorie malnutrition (HCC)  Assessment & Plan  Check prealbumin   Nutrition consultation     Hypokalemia  Assessment & Plan  Replace and recheck     Intracranial aneurysm  Assessment & Plan  will need follow up consider IR intervention in am     Tobacco abuse  Assessment & Plan  Greater than 10 minutes spent with patient smoking cessation counseling. Discussed cardiovascular risk factors of smoking. Nicotine patch provided to patient.     HLD (hyperlipidemia)  Assessment & Plan  High dose statin therapy   Lipid panel     Carotid stenosis  Assessment & Plan  Left ICA stenosis  May need vascular intervention as she is symptomatic     HTN (hypertension)  Assessment & Plan  Permissive htn as clinically appropriate     Ppx: SCD    I spent a total of 31 minutes of non face to face time performing additional research, reviewing old medical records, provided on  going management by following up on labs, placing orders, discussing plan of care with other healthcare providers and nursing staff. Start time: 9:00 pm. End time: 9:31 pm

## 2020-05-08 NOTE — PROGRESS NOTES
2215: Patients , Ian, updated on patients arrival to unit.  provided with unit phone number and patients room number. Updated on plan of care, all questions answered at this time.     2200: Patient c/o IV potassium burning while infusing. IV potassium infusing at tolerable rate per patient report at 60 ml/hr with normal saline infusion. Will increase rate to ordered rate as tolerated.

## 2020-05-08 NOTE — ED PROVIDER NOTES
ED Provider Note    CHIEF COMPLAINT  Chief Complaint   Patient presents with   • Possible Stroke     brought in by ambulance transferred from [facility] for possible stroke. patient c/o dizziness and blurred vision x month but today having trouble walking due to severe dizziness. + nausea. right facial dropp and having hard time articulating. =  and strong.       HPI  Yeny Kirk is a 74 y.o. female who presents as a transfer for a possible acute CVA.  Around noon the patient had some dizziness as well as nausea.  She was noted to be hypertensive and was taken in for evaluation at [facility].  She states with her dizziness she feels like she was falling backwards.  She does not describe vertigo.  She is unaware of any focal weakness.  She did have some dysarthria that was noted at the transfer facility.  Otherwise no other neurologic deficits noted.  The patient states she has not had any recent fevers.  She is also found to be significantly hypertensive.  She had a CT scan that was negative.  Due to the dysarthria she was sent for further evaluation for acute CVA.  Of note the patient did receive Nitropaste initially for her hypertension and is causing severe dizziness.  They did attempt meclizine with no success.  They also attempted losartan 50 mg to treat her blood pressure.    REVIEW OF SYSTEMS  See HPI for further details. All other systems are negative.     PAST MEDICAL HISTORY  No past medical history on file.    FAMILY HISTORY  [unfilled]    SOCIAL HISTORY  Social History     Socioeconomic History   • Marital status: Not on file     Spouse name: Not on file   • Number of children: Not on file   • Years of education: Not on file   • Highest education level: Not on file   Occupational History   • Not on file   Social Needs   • Financial resource strain: Not on file   • Food insecurity     Worry: Not on file     Inability: Not on file   • Transportation needs     Medical: Not on  "file     Non-medical: Not on file   Tobacco Use   • Smoking status: Not on file   Substance and Sexual Activity   • Alcohol use: Not on file   • Drug use: Not on file   • Sexual activity: Not on file   Lifestyle   • Physical activity     Days per week: Not on file     Minutes per session: Not on file   • Stress: Not on file   Relationships   • Social connections     Talks on phone: Not on file     Gets together: Not on file     Attends Protestant service: Not on file     Active member of club or organization: Not on file     Attends meetings of clubs or organizations: Not on file     Relationship status: Not on file   • Intimate partner violence     Fear of current or ex partner: Not on file     Emotionally abused: Not on file     Physically abused: Not on file     Forced sexual activity: Not on file   Other Topics Concern   • Not on file   Social History Narrative   • Not on file       SURGICAL HISTORY  No past surgical history on file.    CURRENT MEDICATIONS  Home Medications    **Home medications have not yet been reviewed for this encounter**         ALLERGIES  Allergies   Allergen Reactions   • Dust Mite Extract    • Pollen Extract        PHYSICAL EXAM  VITAL SIGNS: BP (!) 198/87   Pulse (!) 56   Temp 36.7 °C (98.1 °F) (Temporal)   Resp 16   Ht 1.626 m (5' 4\")   Wt 51.3 kg (113 lb)   SpO2 95%   BMI 19.40 kg/m²       Constitutional: N acute distress, Non-toxic appearance.   HENT: Normocephalic, Atraumatic, Bilateral external ears normal, Oropharynx moist, No oral exudates, Nose normal.   Eyes: PERRLA, EOMI, Conjunctiva normal, No discharge.   Neck: Normal range of motion, No tenderness, Supple, No stridor.   Lymphatic: No lymphadenopathy noted.   Cardiovascular: Normal heart rate, Normal rhythm, No murmurs, No rubs, No gallops.   Thorax & Lungs: Normal breath sounds, No respiratory distress, No wheezing, No chest tenderness.   Abdomen: Bowel sounds normal, Soft, No tenderness, No masses, No pulsatile " masses.   Skin: Warm, Dry, No erythema, No rash.   Back: No tenderness, No CVA tenderness.    Normal sphincter tone. No external or internal lesions noted. Stool is normal color and heme negative.   Extremities: Intact distal pulses, No edema, No tenderness, No cyanosis, No clubbing.   Neurologic: NIH stroke scale of 2 with 1 point for dysarthria and 1 for aphasia is is difficult to determine if he is truly dysarthric versus aphasic.  Psychiatric: Affect normal, Judgment normal, Mood normal.       RADIOLOGY/PROCEDURES  OUTSIDE IMAGES-CT HEAD   Final Result      CT-CTA NECK WITH & W/O-POST PROCESSING   Final Result   Addendum 1 of 1   Neuro interventional radiology consult is advised for potential aneurysm    intervention.      Final      CT-CTA HEAD WITH & W/O-POST PROCESS   Final Result      1.  No large vessel occlusion or high-grade stenosis.   2.  A 3.5 mm aneurysm at the left MCA trifurcation.      CT-CEREBRAL PERFUSION ANALYSIS   Final Result      1.  Cerebral blood flow less than 30% likely representing completed infarct = 0 mL.      2.  T Max more than 6 seconds likely representing combination of completed infarct and ischemia = 0 mL.      3.  Mismatched volume likely representing ischemic brain/penumbra = 0 mL.      4.  Please note that the cerebral perfusion was performed on the limited brain tissue around the basal ganglia region. Infarct/ischemia outside the CT perfusion sections can be missed in this study.      CT-HEAD W/O   Final Result      1. No CT evidence of acute infarct, hemorrhage or mass.   2. Mild global parenchymal atrophy. Chronic small vessel ischemic changes.      DX-CHEST-PORTABLE (1 VIEW)    (Results Pending)   MR-BRAIN-W/O    (Results Pending)   MR-BRAIN-W/O    (Results Pending)   EC-ECHOCARDIOGRAM COMPLETE W/O CONT    (Results Pending)         COURSE & MEDICAL DECISION MAKING  Pertinent Labs & Imaging studies reviewed. (See chart for details)  This a 74-year-old female who presents to  the emergency department with concerning symptoms for a acute CVA.  The last time she was normal was around noon therefore she is not a candidate for TPA.  She would be a candidate for interventional treatment and therefore a CT angiogram as well as perfusion studies were ordered.  CT angios does show a 3 mm aneurysm but no change in flow nor large vessel lesion.  I spoke with the neurologist he did notice some occlusion of the internal carotid artery and he wants an MRI and if there is any evidence of a stroke vascular surgery will be consulted.  We will help her miss of hypertension as we do not cause more of a deficit.  On repeat exam her exam is unchanged and she continues have an NIH stroke scale of 2.  I did review her EKG from the transfer facility does not show any evidence of arrhythmia.  Laboratory analysis was reviewed as well and there is no significant metabolic derangements.    FINAL IMPRESSION  1.  Acute CVA  2.  Hypertension  3.  Critical care time 30 minutes         Electronically signed by: Eliezer Quintana M.D., 5/7/2020 9:08 PM

## 2020-05-08 NOTE — ED NOTES
Patient transported by two ACLS RN's via gurney. All belongings removed from the room and patient's home medications sent to pharmacy via medication bag and documented.

## 2020-05-08 NOTE — CARE PLAN
Problem: Acute Care of the Stroke Patient  Goal: Optimal Outcome for the Stroke patient  Outcome: PROGRESSING AS EXPECTED  Note: Vitals, NIHSS, education, completed. updated pt on plan of care        Problem: Safety  Goal: Free from accidental injury  Outcome: PROGRESSING AS EXPECTED  Note: Fall precautions completed, fall education given; pt accepting of teaching. Pt using call light appropriately.

## 2020-05-08 NOTE — TELEPHONE ENCOUNTER
Called by Transfer Center     [facility]  Dr. Monsalve     74 year old smoker with multiple vascular risk factors presenting with acute onset nonremitting vertigo, present at rest, with associated severe nystagmus (possibly direction changing) on ERP exam at OSH.    Patient LKN >4.5 hr from time of call.    Ddx includes posterior circulation stroke; transfer to [facility] to be expedited.  Upon arrival to ED, advised to call a STROKE IR with expedited CTA imaging.    Please page neurology upon patient arrival for additional recommendations and full consultation.     Maxwell Allen MD  Director, Comprehensive Stroke Center, [facility]  Neurohospitalist, [facility]  Clinical  of Neurology, [employer] [facility] [facility] [employer]  t) [phone] (f) [phone]

## 2020-05-08 NOTE — PROGRESS NOTES
Neurology Addendum in Care Planning    Case discussed with Dr. Jordy Ibarra via telephone call.    Recommendations are as follows:    1. L MCA aneurism  - manage conservatively  - repeat vessel imaging in 1yr to monitor for stability    2. L ICA asymptomatic stenosis of the vessel origin  - consult vascular surgery to determine if pt. Is a candidate for nonurgent intervention    Recommendations relayed to Dr. Mauricio Daily via phone call.  Please see full progress note authored by Dr. Luly Fallon earlier today with my attestation for additional details.    Maxwell Allen MD  Director, Comprehensive Stroke Center, [facility]  Neurohospitalist, [facility]  Clinical  of Neurology, [facility] [employer]  t) [phone] (f) [phone]

## 2020-05-09 PROBLEM — R42 VERTIGO: Status: ACTIVE | Noted: 2020-05-07

## 2020-05-09 LAB
ANION GAP SERPL CALC-SCNC: 9 MMOL/L (ref 7–16)
BUN SERPL-MCNC: 15 MG/DL (ref 8–22)
CALCIUM SERPL-MCNC: 9.1 MG/DL (ref 8.5–10.5)
CHLORIDE SERPL-SCNC: 107 MMOL/L (ref 96–112)
CO2 SERPL-SCNC: 22 MMOL/L (ref 20–33)
COVID ORDER STATUS COVID19: NORMAL
CREAT SERPL-MCNC: 0.75 MG/DL (ref 0.5–1.4)
EKG IMPRESSION: NORMAL
ERYTHROCYTE [DISTWIDTH] IN BLOOD BY AUTOMATED COUNT: 42.2 FL (ref 35.9–50)
GLUCOSE SERPL-MCNC: 106 MG/DL (ref 65–99)
HCT VFR BLD AUTO: 41.4 % (ref 37–47)
HGB BLD-MCNC: 13.9 G/DL (ref 12–16)
MCH RBC QN AUTO: 28.3 PG (ref 27–33)
MCHC RBC AUTO-ENTMCNC: 33.6 G/DL (ref 33.6–35)
MCV RBC AUTO: 84.1 FL (ref 81.4–97.8)
PLATELET # BLD AUTO: 145 K/UL (ref 164–446)
PMV BLD AUTO: 10.2 FL (ref 9–12.9)
POTASSIUM SERPL-SCNC: 4 MMOL/L (ref 3.6–5.5)
RBC # BLD AUTO: 4.92 M/UL (ref 4.2–5.4)
SARS-COV-2 RNA RESP QL NAA+PROBE: NOTDETECTED
SODIUM SERPL-SCNC: 138 MMOL/L (ref 135–145)
SPECIMEN SOURCE: NORMAL
WBC # BLD AUTO: 5.7 K/UL (ref 4.8–10.8)

## 2020-05-09 PROCEDURE — 700111 HCHG RX REV CODE 636 W/ 250 OVERRIDE (IP): Performed by: HOSPITALIST

## 2020-05-09 PROCEDURE — 700102 HCHG RX REV CODE 250 W/ 637 OVERRIDE(OP): Performed by: HOSPITALIST

## 2020-05-09 PROCEDURE — A9270 NON-COVERED ITEM OR SERVICE: HCPCS | Performed by: HOSPITALIST

## 2020-05-09 PROCEDURE — 93005 ELECTROCARDIOGRAM TRACING: CPT | Performed by: SURGERY

## 2020-05-09 PROCEDURE — U0004 COV-19 TEST NON-CDC HGH THRU: HCPCS

## 2020-05-09 PROCEDURE — 85027 COMPLETE CBC AUTOMATED: CPT

## 2020-05-09 PROCEDURE — 93010 ELECTROCARDIOGRAM REPORT: CPT | Performed by: INTERNAL MEDICINE

## 2020-05-09 PROCEDURE — 770020 HCHG ROOM/CARE - TELE (206)

## 2020-05-09 PROCEDURE — 99233 SBSQ HOSP IP/OBS HIGH 50: CPT | Performed by: HOSPITALIST

## 2020-05-09 PROCEDURE — 700105 HCHG RX REV CODE 258: Performed by: HOSPITALIST

## 2020-05-09 PROCEDURE — G2023 SPECIMEN COLLECT COVID-19: HCPCS | Performed by: SURGERY

## 2020-05-09 PROCEDURE — 80048 BASIC METABOLIC PNL TOTAL CA: CPT

## 2020-05-09 PROCEDURE — 36415 COLL VENOUS BLD VENIPUNCTURE: CPT

## 2020-05-09 RX ORDER — VERAPAMIL HYDROCHLORIDE 80 MG/1
40 TABLET ORAL DAILY
Status: DISCONTINUED | OUTPATIENT
Start: 2020-05-09 | End: 2020-05-12 | Stop reason: HOSPADM

## 2020-05-09 RX ORDER — CLOPIDOGREL BISULFATE 75 MG/1
75 TABLET ORAL DAILY
Status: DISCONTINUED | OUTPATIENT
Start: 2020-05-09 | End: 2020-05-12 | Stop reason: HOSPADM

## 2020-05-09 RX ORDER — SCOLOPAMINE TRANSDERMAL SYSTEM 1 MG/1
1 PATCH, EXTENDED RELEASE TRANSDERMAL
Status: DISCONTINUED | OUTPATIENT
Start: 2020-05-09 | End: 2020-05-12 | Stop reason: HOSPADM

## 2020-05-09 RX ORDER — METOPROLOL SUCCINATE 25 MG/1
25 TABLET, EXTENDED RELEASE ORAL DAILY
Status: DISCONTINUED | OUTPATIENT
Start: 2020-05-09 | End: 2020-05-12 | Stop reason: HOSPADM

## 2020-05-09 RX ORDER — LABETALOL HYDROCHLORIDE 5 MG/ML
10 INJECTION, SOLUTION INTRAVENOUS EVERY 4 HOURS PRN
Status: DISCONTINUED | OUTPATIENT
Start: 2020-05-09 | End: 2020-05-12 | Stop reason: HOSPADM

## 2020-05-09 RX ORDER — HYDRALAZINE HYDROCHLORIDE 20 MG/ML
10 INJECTION INTRAMUSCULAR; INTRAVENOUS
Status: DISCONTINUED | OUTPATIENT
Start: 2020-05-09 | End: 2020-05-12 | Stop reason: HOSPADM

## 2020-05-09 RX ORDER — LOSARTAN POTASSIUM 50 MG/1
50 TABLET ORAL DAILY
Status: DISCONTINUED | OUTPATIENT
Start: 2020-05-09 | End: 2020-05-12 | Stop reason: HOSPADM

## 2020-05-09 RX ADMIN — LOSARTAN POTASSIUM 50 MG: 50 TABLET, FILM COATED ORAL at 13:00

## 2020-05-09 RX ADMIN — MAGNESIUM HYDROXIDE 30 ML: 400 SUSPENSION ORAL at 17:58

## 2020-05-09 RX ADMIN — CLOPIDOGREL BISULFATE 75 MG: 75 TABLET ORAL at 13:03

## 2020-05-09 RX ADMIN — VERAPAMIL HYDROCHLORIDE 40 MG: 80 TABLET, FILM COATED ORAL at 14:33

## 2020-05-09 RX ADMIN — ASPIRIN 325 MG: 325 TABLET, FILM COATED ORAL at 04:28

## 2020-05-09 RX ADMIN — SCOPALAMINE 1 PATCH: 1 PATCH, EXTENDED RELEASE TRANSDERMAL at 14:34

## 2020-05-09 RX ADMIN — HYDRALAZINE HYDROCHLORIDE 10 MG: 20 INJECTION INTRAMUSCULAR; INTRAVENOUS at 20:36

## 2020-05-09 RX ADMIN — SENNOSIDES AND DOCUSATE SODIUM 2 TABLET: 8.6; 5 TABLET ORAL at 17:58

## 2020-05-09 RX ADMIN — ACETAMINOPHEN 650 MG: 325 TABLET, FILM COATED ORAL at 00:04

## 2020-05-09 RX ADMIN — METOPROLOL SUCCINATE 25 MG: 25 TABLET, EXTENDED RELEASE ORAL at 13:00

## 2020-05-09 RX ADMIN — NICOTINE 7 MG: 7 PATCH TRANSDERMAL at 04:28

## 2020-05-09 RX ADMIN — ATORVASTATIN CALCIUM 40 MG: 40 TABLET, FILM COATED ORAL at 17:58

## 2020-05-09 RX ADMIN — HYDRALAZINE HYDROCHLORIDE 10 MG: 20 INJECTION INTRAMUSCULAR; INTRAVENOUS at 15:42

## 2020-05-09 RX ADMIN — SENNOSIDES AND DOCUSATE SODIUM 2 TABLET: 8.6; 5 TABLET ORAL at 04:28

## 2020-05-09 RX ADMIN — ACETAMINOPHEN 650 MG: 325 TABLET, FILM COATED ORAL at 21:45

## 2020-05-09 RX ADMIN — SODIUM CHLORIDE: 9 INJECTION, SOLUTION INTRAVENOUS at 06:40

## 2020-05-09 ASSESSMENT — ENCOUNTER SYMPTOMS
DOUBLE VISION: 0
NAUSEA: 0
BACK PAIN: 0
VOMITING: 0
COUGH: 0
WEIGHT LOSS: 0
MYALGIAS: 0
HEARTBURN: 0
CLAUDICATION: 0
CHILLS: 0
NERVOUS/ANXIOUS: 0
SENSORY CHANGE: 0
BLURRED VISION: 0
WEAKNESS: 1
ABDOMINAL PAIN: 0
EYE PAIN: 0
PALPITATIONS: 0
HEMOPTYSIS: 0
FEVER: 0
ORTHOPNEA: 0
SPUTUM PRODUCTION: 0
NECK PAIN: 0
PHOTOPHOBIA: 0
DEPRESSION: 0
DIZZINESS: 1

## 2020-05-09 ASSESSMENT — PATIENT HEALTH QUESTIONNAIRE - PHQ9
1. LITTLE INTEREST OR PLEASURE IN DOING THINGS: NOT AT ALL
SUM OF ALL RESPONSES TO PHQ9 QUESTIONS 1 AND 2: 0
2. FEELING DOWN, DEPRESSED, IRRITABLE, OR HOPELESS: NOT AT ALL

## 2020-05-09 ASSESSMENT — FIBROSIS 4 INDEX: FIB4 SCORE: 2.13

## 2020-05-09 NOTE — PROGRESS NOTES
Hospitalist (Dr. Daily) rounded on pt at bedside. Per MD d/c fluids. MD additionally notified that 's. No further orders at this time.

## 2020-05-09 NOTE — CONSULTS
DATE OF CONSULTATION: 5/9/2020     REFERRING PHYSICIAN: MD Killian.     CONSULTING PHYSICIAN: Lino Oswald M.D.      REASON FOR CONSULTATION: Evaluate patient with symptomatic left carotid artery stenosis.       HISTORY OF PRESENT ILLNESS: The patient is a 74-year-old female who presented to the hospital 2 days ago with visual changes in her left eye and dizziness.  She apparently had some episodes of difficulty speaking.  Patient still has persistent visual issues going on in the left eye but neurologically otherwise is intact with equal strength in upper and lower extremities.  Work-up was initiated which included a CTA which demonstrated significant left carotid artery stenosis.  There is also a small intracranial aneurysm.  Patient underwent catheter directed angiography which confirmed the presence of the aneurysm and the stenosis.    PAST MEDICAL HISTORY:  has a past medical history of Hypertension.     PAST SURGICAL HISTORY: patient denies any surgical history     ALLERGIES:   Allergies   Allergen Reactions   • Dust Mite Extract    • Pollen Extract         CURRENT MEDICATIONS:   Home Medications     Reviewed by Jah Monroe (Pharmacy Tech) on 05/07/20 at 2125  Med List Status: Unable to Obtain   Medication Last Dose Status   losartan (COZAAR) 50 MG Tab  Active   metoprolol SR (TOPROL XL) 25 MG TABLET SR 24 HR  Active   verapamil (ISOPTIN) 120 MG Tab  Active                FAMILY HISTORY:   Family History   Problem Relation Age of Onset   • Diabetes Sister         SOCIAL HISTORY:   Social History     Tobacco Use   • Smoking status: Current Every Day Smoker     Packs/day: 0.30     Types: Cigarettes   • Smokeless tobacco: Never Used   Substance and Sexual Activity   • Alcohol use: Never     Frequency: Never   • Drug use: Never   • Sexual activity: Not on file       Review of Systems:      Constitutional: Reports dizziness   eyes: Visual changes left eye  Ears/Nose/Throat/Mouth: Denies nasal congestion  or sore throat   Cardiovascular: Denies chest pain or palpitations.  Respiratory: Denies shortness of breath, cough, and wheezing.  Gastrointestinal/Hepatic: Denies abdominal pain, nausea, vomiting, diarrhea, constipation or GI bleeding   Genitourinary: Denies dysuria or frequency  Musculoskeletal/Rheum: Denies  joint pain and swelling, no edema  Skin: Denies rash  Neurological: Denies headache, confusion, memory loss or focal weakness/parasthesias  Psychiatric: denies mood disorder   Endocrine: Isabel thyroid problems  Heme/Oncology/Lymph Nodes: Denies enlarged lymph nodes, denies brusing or known bleeding disorder  All other systems were reviewed and are negative (AMA/CMS criteria)                  PHYSICAL EXAMINATION:       Constitutional:   Elderly thin female   HENMT:  Normocephalic, Atraumatic, Oropharynx moist mucous membranes, No oral exudates, Nose normal.  No thyromegaly.  Eyes:  EOMI, Conjunctiva normal, No discharge.  Neck:  Normal range of motion, No cervical tenderness,  no JVD.  Cardiovascular:  Normal heart rate, Normal rhythm, No murmurs, No rubs, No gallops.   Extremitites with intact distal pulses, no cyanosis, or edema.  Lungs:  Normal breath sounds, breath sounds clear to auscultation bilaterally,  no crackles, no wheezing.   Abdomen: Bowel sounds normal, Soft, No tenderness, No guarding, No rebound, No masses, No hepatosplenomegaly.  Skin: Warm, Dry, No erythema, No rash, no induration.  Neurologic: Alert & oriented x 3, No focal deficits noted, cranial nerves II through X are grossly intact.  Psychiatric: Affect normal, Judgment normal, Mood normal.        LABORATORY VALUES:   Recent Labs     05/07/20  2036 05/08/20  0407 05/09/20  0433   WBC 6.4 7.3 5.7   RBC 4.41 4.92 4.92   HEMOGLOBIN 12.3 13.8 13.9   HEMATOCRIT 37.9 42.1 41.4   MCV 85.9 85.6 84.1   MCH 27.9 28.0 28.3   MCHC 32.5* 32.8* 33.6   RDW 42.4 43.4 42.2   PLATELETCT 139* 158* 145*   MPV 10.4 10.2 10.2     Recent Labs      05/07/20 2036 05/08/20 0407 05/09/20  0433   SODIUM 143 136 138   POTASSIUM 3.1* 3.8 4.0   CHLORIDE 115* 104 107   CO2 20 24 22   GLUCOSE 86 96 106*   BUN 11 11 15   CREATININE 0.53 0.75 0.75   CALCIUM 7.1* 9.0 9.1     Recent Labs     05/07/20 2036 05/08/20  0407   ASTSGOT 11* 16   ALTSGPT 11 13   TBILIRUBIN 0.7 1.0   ALKPHOSPHAT 80 100*   GLOBULIN 2.1 2.2   INR 1.21*  --      Recent Labs     05/07/20 2036   APTT 35.8   INR 1.21*        IMAGING:   MR-BRAIN-W/O   Final Result         1. Age-related cerebral atrophy.      2. Mild to moderate periventricular and juxtacortical white matter changes consistent with chronic microvascular ischemic gliosis.      3. Chronic left maxillary sinusitis.      EC-ECHOCARDIOGRAM COMPLETE W/O CONT   Final Result      US-CAROTID DOPPLER BILAT   Final Result      OUTSIDE IMAGES-CT HEAD   Final Result      DX-CHEST-PORTABLE (1 VIEW)   Final Result      No acute cardiopulmonary abnormality.      CT-CTA NECK WITH & W/O-POST PROCESSING   Final Result   Addendum 1 of 1   Neuro interventional radiology consult is advised for potential aneurysm    intervention.      Final      CT-CTA HEAD WITH & W/O-POST PROCESS   Final Result      1.  No large vessel occlusion or high-grade stenosis.   2.  A 3.5 mm aneurysm at the left MCA trifurcation.      CT-CEREBRAL PERFUSION ANALYSIS   Final Result      1.  Cerebral blood flow less than 30% likely representing completed infarct = 0 mL.      2.  T Max more than 6 seconds likely representing combination of completed infarct and ischemia = 0 mL.      3.  Mismatched volume likely representing ischemic brain/penumbra = 0 mL.      4.  Please note that the cerebral perfusion was performed on the limited brain tissue around the basal ganglia region. Infarct/ischemia outside the CT perfusion sections can be missed in this study.      CT-HEAD W/O   Final Result      1. No CT evidence of acute infarct, hemorrhage or mass.   2. Mild global parenchymal atrophy.  Chronic small vessel ischemic changes.          IMPRESSION AND PLAN:     Active Hospital Problems    Diagnosis   • CVA (cerebral vascular accident) (HCC) [I63.9]   • HTN (hypertension) [I10]   • Carotid stenosis [I65.29]   • HLD (hyperlipidemia) [E78.5]   • Tobacco abuse [Z72.0]   • Intracranial aneurysm [I67.1]   • Hypokalemia [E87.6]   • Severe protein-calorie malnutrition (HCC) [E43]     Symptomatic left carotid artery stenosis  Intracranial aneurysm small    Recommend left carotid endarterectomy    Risk benefits alternatives and expectations discussed with the patient she agrees to proceed.  Continue antiplatelet therapy  Surgery scheduled for tomorrow at 11 AM  ____________________________________   Lino Oswald M.D.          DD: 5/9/2020   DT: 12:56 PM

## 2020-05-09 NOTE — PROGRESS NOTES
Monitor summary: SB 50-59, W[place] .16, QRS .08, QT .36, with a HR as low as 42 per strip from monitor room.

## 2020-05-09 NOTE — CARE PLAN
Problem: Safety  Goal: Will remain free from injury  Outcome: PROGRESSING AS EXPECTED  Goal: Will remain free from falls  Outcome: PROGRESSING AS EXPECTED     Problem: Discharge Barriers/Planning  Goal: Patient's continuum of care needs will be met  Outcome: PROGRESSING AS EXPECTED     Problem: Acute Care of the Stroke Patient  Goal: Optimal Outcome for the Stroke patient  Outcome: PROGRESSING AS EXPECTED

## 2020-05-10 ENCOUNTER — ANESTHESIA (OUTPATIENT)
Dept: SURGERY | Facility: MEDICAL CENTER | Age: 75
DRG: 037 | End: 2020-05-10
Payer: MEDICARE

## 2020-05-10 ENCOUNTER — ANESTHESIA EVENT (OUTPATIENT)
Dept: SURGERY | Facility: MEDICAL CENTER | Age: 75
DRG: 037 | End: 2020-05-10
Payer: MEDICARE

## 2020-05-10 PROCEDURE — 160029 HCHG SURGERY MINUTES - 1ST 30 MINS LEVEL 4: Performed by: SURGERY

## 2020-05-10 PROCEDURE — 03CL0ZZ EXTIRPATION OF MATTER FROM LEFT INTERNAL CAROTID ARTERY, OPEN APPROACH: ICD-10-PCS | Performed by: SURGERY

## 2020-05-10 PROCEDURE — 700101 HCHG RX REV CODE 250: Performed by: SURGERY

## 2020-05-10 PROCEDURE — 03HY32Z INSERTION OF MONITORING DEVICE INTO UPPER ARTERY, PERCUTANEOUS APPROACH: ICD-10-PCS | Performed by: ANESTHESIOLOGY

## 2020-05-10 PROCEDURE — 700111 HCHG RX REV CODE 636 W/ 250 OVERRIDE (IP): Performed by: SURGERY

## 2020-05-10 PROCEDURE — 160009 HCHG ANES TIME/MIN: Performed by: SURGERY

## 2020-05-10 PROCEDURE — 700111 HCHG RX REV CODE 636 W/ 250 OVERRIDE (IP): Performed by: HOSPITALIST

## 2020-05-10 PROCEDURE — 700105 HCHG RX REV CODE 258: Performed by: ANESTHESIOLOGY

## 2020-05-10 PROCEDURE — 770006 HCHG ROOM/CARE - MED/SURG/GYN SEMI*

## 2020-05-10 PROCEDURE — 95955 EEG DURING SURGERY: CPT | Performed by: SURGERY

## 2020-05-10 PROCEDURE — 160048 HCHG OR STATISTICAL LEVEL 1-5: Performed by: SURGERY

## 2020-05-10 PROCEDURE — 110454 HCHG SHELL REV 250: Performed by: SURGERY

## 2020-05-10 PROCEDURE — 160002 HCHG RECOVERY MINUTES (STAT): Performed by: SURGERY

## 2020-05-10 PROCEDURE — 770020 HCHG ROOM/CARE - TELE (206)

## 2020-05-10 PROCEDURE — 500368 HCHG DRAIN, 7MM FLAT-FLUTED: Performed by: SURGERY

## 2020-05-10 PROCEDURE — 110372 HCHG SHELL REV 278: Performed by: SURGERY

## 2020-05-10 PROCEDURE — 95940 IONM IN OPERATNG ROOM 15 MIN: CPT | Performed by: SURGERY

## 2020-05-10 PROCEDURE — A6402 STERILE GAUZE <= 16 SQ IN: HCPCS | Performed by: SURGERY

## 2020-05-10 PROCEDURE — 501837 HCHG SUTURE CV: Performed by: SURGERY

## 2020-05-10 PROCEDURE — 500257: Performed by: SURGERY

## 2020-05-10 PROCEDURE — 03UL0KZ SUPPLEMENT LEFT INTERNAL CAROTID ARTERY WITH NONAUTOLOGOUS TISSUE SUBSTITUTE, OPEN APPROACH: ICD-10-PCS | Performed by: SURGERY

## 2020-05-10 PROCEDURE — 501445 HCHG STAPLER, SKIN DISP: Performed by: SURGERY

## 2020-05-10 PROCEDURE — C1768 GRAFT, VASCULAR: HCPCS | Performed by: SURGERY

## 2020-05-10 PROCEDURE — 160035 HCHG PACU - 1ST 60 MINS PHASE I: Performed by: SURGERY

## 2020-05-10 PROCEDURE — 700102 HCHG RX REV CODE 250 W/ 637 OVERRIDE(OP): Performed by: HOSPITALIST

## 2020-05-10 PROCEDURE — 700101 HCHG RX REV CODE 250: Performed by: ANESTHESIOLOGY

## 2020-05-10 PROCEDURE — 500389 HCHG DRAIN, RESERVOIR SUCT JP 100CC: Performed by: SURGERY

## 2020-05-10 PROCEDURE — 160041 HCHG SURGERY MINUTES - EA ADDL 1 MIN LEVEL 4: Performed by: SURGERY

## 2020-05-10 PROCEDURE — A9270 NON-COVERED ITEM OR SERVICE: HCPCS | Performed by: HOSPITALIST

## 2020-05-10 PROCEDURE — 700111 HCHG RX REV CODE 636 W/ 250 OVERRIDE (IP): Performed by: ANESTHESIOLOGY

## 2020-05-10 PROCEDURE — 501838 HCHG SUTURE GENERAL: Performed by: SURGERY

## 2020-05-10 PROCEDURE — 99232 SBSQ HOSP IP/OBS MODERATE 35: CPT | Performed by: HOSPITALIST

## 2020-05-10 PROCEDURE — 160036 HCHG PACU - EA ADDL 30 MINS PHASE I: Performed by: SURGERY

## 2020-05-10 PROCEDURE — 95938 SOMATOSENSORY TESTING: CPT | Performed by: SURGERY

## 2020-05-10 DEVICE — PATCH .8X8CM XENOSURE BIOLOGIC VASCULAR---ORDER IN MULTIPLES OF 5---: Type: IMPLANTABLE DEVICE | Site: NECK | Status: FUNCTIONAL

## 2020-05-10 RX ORDER — HYDROMORPHONE HYDROCHLORIDE 1 MG/ML
0.2 INJECTION, SOLUTION INTRAMUSCULAR; INTRAVENOUS; SUBCUTANEOUS
Status: DISCONTINUED | OUTPATIENT
Start: 2020-05-10 | End: 2020-05-10 | Stop reason: HOSPADM

## 2020-05-10 RX ORDER — REMIFENTANIL HYDROCHLORIDE 1 MG/ML
INJECTION, POWDER, LYOPHILIZED, FOR SOLUTION INTRAVENOUS
Status: DISCONTINUED | OUTPATIENT
Start: 2020-05-10 | End: 2020-05-10 | Stop reason: SURG

## 2020-05-10 RX ORDER — ONDANSETRON 2 MG/ML
4 INJECTION INTRAMUSCULAR; INTRAVENOUS
Status: COMPLETED | OUTPATIENT
Start: 2020-05-10 | End: 2020-05-10

## 2020-05-10 RX ORDER — HYDROMORPHONE HYDROCHLORIDE 1 MG/ML
0.6 INJECTION, SOLUTION INTRAMUSCULAR; INTRAVENOUS; SUBCUTANEOUS
Status: DISCONTINUED | OUTPATIENT
Start: 2020-05-10 | End: 2020-05-10 | Stop reason: HOSPADM

## 2020-05-10 RX ORDER — CEFAZOLIN SODIUM 1 G/3ML
INJECTION, POWDER, FOR SOLUTION INTRAMUSCULAR; INTRAVENOUS PRN
Status: DISCONTINUED | OUTPATIENT
Start: 2020-05-10 | End: 2020-05-10 | Stop reason: SURG

## 2020-05-10 RX ORDER — SODIUM CHLORIDE, SODIUM LACTATE, POTASSIUM CHLORIDE, CALCIUM CHLORIDE 600; 310; 30; 20 MG/100ML; MG/100ML; MG/100ML; MG/100ML
INJECTION, SOLUTION INTRAVENOUS
Status: DISCONTINUED | OUTPATIENT
Start: 2020-05-10 | End: 2020-05-10 | Stop reason: SURG

## 2020-05-10 RX ORDER — METOPROLOL TARTRATE 1 MG/ML
1 INJECTION, SOLUTION INTRAVENOUS
Status: DISCONTINUED | OUTPATIENT
Start: 2020-05-10 | End: 2020-05-10 | Stop reason: HOSPADM

## 2020-05-10 RX ORDER — ROCURONIUM BROMIDE 10 MG/ML
INJECTION, SOLUTION INTRAVENOUS PRN
Status: DISCONTINUED | OUTPATIENT
Start: 2020-05-10 | End: 2020-05-10 | Stop reason: SURG

## 2020-05-10 RX ORDER — PROTAMINE SULFATE 10 MG/ML
INJECTION, SOLUTION INTRAVENOUS PRN
Status: DISCONTINUED | OUTPATIENT
Start: 2020-05-10 | End: 2020-05-10 | Stop reason: SURG

## 2020-05-10 RX ORDER — OXYCODONE HCL 5 MG/5 ML
10 SOLUTION, ORAL ORAL
Status: DISCONTINUED | OUTPATIENT
Start: 2020-05-10 | End: 2020-05-10 | Stop reason: HOSPADM

## 2020-05-10 RX ORDER — DIPHENHYDRAMINE HYDROCHLORIDE 50 MG/ML
12.5 INJECTION INTRAMUSCULAR; INTRAVENOUS
Status: DISCONTINUED | OUTPATIENT
Start: 2020-05-10 | End: 2020-05-10 | Stop reason: HOSPADM

## 2020-05-10 RX ORDER — HEPARIN SODIUM,PORCINE 1000/ML
VIAL (ML) INJECTION
Status: DISCONTINUED | OUTPATIENT
Start: 2020-05-10 | End: 2020-05-10 | Stop reason: HOSPADM

## 2020-05-10 RX ORDER — SODIUM CHLORIDE, SODIUM LACTATE, POTASSIUM CHLORIDE, CALCIUM CHLORIDE 600; 310; 30; 20 MG/100ML; MG/100ML; MG/100ML; MG/100ML
INJECTION, SOLUTION INTRAVENOUS CONTINUOUS
Status: DISCONTINUED | OUTPATIENT
Start: 2020-05-10 | End: 2020-05-10 | Stop reason: HOSPADM

## 2020-05-10 RX ORDER — HYDROMORPHONE HYDROCHLORIDE 1 MG/ML
0.4 INJECTION, SOLUTION INTRAMUSCULAR; INTRAVENOUS; SUBCUTANEOUS
Status: DISCONTINUED | OUTPATIENT
Start: 2020-05-10 | End: 2020-05-10 | Stop reason: HOSPADM

## 2020-05-10 RX ORDER — PHENYLEPHRINE HCL IN 0.9% NACL 0.5 MG/5ML
SYRINGE (ML) INTRAVENOUS PRN
Status: DISCONTINUED | OUTPATIENT
Start: 2020-05-10 | End: 2020-05-10 | Stop reason: SURG

## 2020-05-10 RX ORDER — HALOPERIDOL 5 MG/ML
1 INJECTION INTRAMUSCULAR
Status: DISCONTINUED | OUTPATIENT
Start: 2020-05-10 | End: 2020-05-10 | Stop reason: HOSPADM

## 2020-05-10 RX ORDER — MEPERIDINE HYDROCHLORIDE 25 MG/ML
6.25 INJECTION INTRAMUSCULAR; INTRAVENOUS; SUBCUTANEOUS
Status: DISCONTINUED | OUTPATIENT
Start: 2020-05-10 | End: 2020-05-10 | Stop reason: HOSPADM

## 2020-05-10 RX ORDER — HYDRALAZINE HYDROCHLORIDE 20 MG/ML
5 INJECTION INTRAMUSCULAR; INTRAVENOUS
Status: DISCONTINUED | OUTPATIENT
Start: 2020-05-10 | End: 2020-05-10 | Stop reason: HOSPADM

## 2020-05-10 RX ORDER — OXYCODONE HCL 5 MG/5 ML
5 SOLUTION, ORAL ORAL
Status: DISCONTINUED | OUTPATIENT
Start: 2020-05-10 | End: 2020-05-10 | Stop reason: HOSPADM

## 2020-05-10 RX ADMIN — PROTAMINE SULFATE 50 MG: 10 INJECTION, SOLUTION INTRAVENOUS at 12:40

## 2020-05-10 RX ADMIN — SODIUM CHLORIDE, POTASSIUM CHLORIDE, SODIUM LACTATE AND CALCIUM CHLORIDE: 600; 310; 30; 20 INJECTION, SOLUTION INTRAVENOUS at 14:32

## 2020-05-10 RX ADMIN — CEFAZOLIN 1 G: 330 INJECTION, POWDER, FOR SOLUTION INTRAMUSCULAR; INTRAVENOUS at 11:33

## 2020-05-10 RX ADMIN — PROPOFOL 150 MG: 10 INJECTION, EMULSION INTRAVENOUS at 11:32

## 2020-05-10 RX ADMIN — ONDANSETRON 4 MG: 2 INJECTION INTRAMUSCULAR; INTRAVENOUS at 14:01

## 2020-05-10 RX ADMIN — ATORVASTATIN CALCIUM 40 MG: 40 TABLET, FILM COATED ORAL at 16:36

## 2020-05-10 RX ADMIN — SENNOSIDES AND DOCUSATE SODIUM 2 TABLET: 8.6; 5 TABLET ORAL at 16:36

## 2020-05-10 RX ADMIN — ACETAMINOPHEN 650 MG: 325 TABLET, FILM COATED ORAL at 17:37

## 2020-05-10 RX ADMIN — HYDRALAZINE HYDROCHLORIDE 10 MG: 20 INJECTION INTRAMUSCULAR; INTRAVENOUS at 16:28

## 2020-05-10 RX ADMIN — REMIFENTANIL HYDROCHLORIDE 0.1 MCG/KG/MIN: 1 INJECTION, POWDER, LYOPHILIZED, FOR SOLUTION INTRAVENOUS at 11:47

## 2020-05-10 RX ADMIN — EPHEDRINE SULFATE 10 MG: 50 INJECTION, SOLUTION INTRAVENOUS at 11:43

## 2020-05-10 RX ADMIN — ROCURONIUM BROMIDE 50 MG: 10 INJECTION, SOLUTION INTRAVENOUS at 11:33

## 2020-05-10 RX ADMIN — SODIUM CHLORIDE, POTASSIUM CHLORIDE, SODIUM LACTATE AND CALCIUM CHLORIDE: 600; 310; 30; 20 INJECTION, SOLUTION INTRAVENOUS at 11:33

## 2020-05-10 RX ADMIN — EPHEDRINE SULFATE 10 MG: 50 INJECTION, SOLUTION INTRAVENOUS at 12:06

## 2020-05-10 RX ADMIN — Medication 100 MCG: at 12:05

## 2020-05-10 RX ADMIN — HEPARIN SODIUM 5000 UNITS: 1000 INJECTION, SOLUTION INTRAVENOUS; SUBCUTANEOUS at 12:05

## 2020-05-10 ASSESSMENT — ENCOUNTER SYMPTOMS
BACK PAIN: 0
DIZZINESS: 1
PALPITATIONS: 0
BLURRED VISION: 0
WEIGHT LOSS: 0
ABDOMINAL PAIN: 0
HEARTBURN: 0
COUGH: 0
ORTHOPNEA: 0
HEMOPTYSIS: 0
DOUBLE VISION: 0
DEPRESSION: 0
PHOTOPHOBIA: 0
EYE PAIN: 0
NECK PAIN: 0
WEAKNESS: 1
NAUSEA: 0
CHILLS: 0
VOMITING: 0
SENSORY CHANGE: 0
MYALGIAS: 0
SPUTUM PRODUCTION: 0
CLAUDICATION: 0
FEVER: 0
NERVOUS/ANXIOUS: 0

## 2020-05-10 ASSESSMENT — PAIN SCALES - GENERAL: PAIN_LEVEL: 2

## 2020-05-10 NOTE — OR NURSING
Vss. A&Ox4. Neuro intact. Denies numbness or tingling. Stroke scale negative.   Dressing to L neck is CDI. RICARDO drain with scant amounts of drainage.

## 2020-05-10 NOTE — CARE PLAN
Problem: Communication  Goal: The ability to communicate needs accurately and effectively will improve  Outcome: PROGRESSING AS EXPECTED     Problem: Safety  Goal: Will remain free from injury  Outcome: PROGRESSING AS EXPECTED  Goal: Will remain free from falls  Outcome: PROGRESSING AS EXPECTED     Problem: Risk for Impaired Mobility--Activity Intolerance  Goal: Mobilize and/or Transfer Safely with Maximum [place]  Outcome: PROGRESSING AS EXPECTED

## 2020-05-10 NOTE — ANESTHESIA TIME REPORT
Anesthesia Start and Stop Event Times     Date Time Event    5/10/2020 1104 Ready for Procedure     1132 Anesthesia Start     1258 Anesthesia Stop        Responsible Staff  05/10/20    Name Role Begin End    Natanael Castanon M.D. Anesth 1132 1250        Preop Diagnosis (Free Text):  Pre-op Diagnosis     A 3.5 mm aneurysm at the left MCA trifurcation        Preop Diagnosis (Codes):    Post op Diagnosis  S/P carotid endarterectomy  A line YANY    Premium Reason  E. Weekend    Comments: PREMIUM YANY

## 2020-05-10 NOTE — OR NURSING
Vss. A&Ox4. Ambulates to bathroom with stand by assist. Dressing CDI. Neuro intact. No complications.

## 2020-05-10 NOTE — PROGRESS NOTES
Monitor summary: SB/SR 53-70, UT 0.20, QRS 0.10, QT 0.40, with rare PVCs  per strip from monitor room.

## 2020-05-10 NOTE — PROGRESS NOTES
Monitor summary: SB-SR 62-77, ME .16, QRS .08, QT .38 with rare to occasional PVCs per strip from monitor room.

## 2020-05-10 NOTE — ANESTHESIA PREPROCEDURE EVALUATION
Relevant Problems   CARDIAC   (+) Carotid stenosis   (+) HTN (hypertension)   (+) Intracranial aneurysm       Physical Exam    Airway   Mallampati: II  TM distance: >3 FB  Neck ROM: full       Cardiovascular - normal exam  Rhythm: regular  Rate: normal  (-) murmur     Dental - normal exam           Pulmonary - normal exam  Breath sounds clear to auscultation     Abdominal    Neurological - normal exam                 Anesthesia Plan    ASA 3   ASA physical status 3 criteria: COPD and hypertension - poorly controlled    Plan - general       Airway plan will be ETT        Induction: intravenous    Postoperative Plan: Postoperative administration of opioids is intended.    Pertinent diagnostic labs and testing reviewed    Informed Consent:    Anesthetic plan and risks discussed with patient.    Use of blood products discussed with: patient whom consented to blood products.

## 2020-05-10 NOTE — PROGRESS NOTES
2 RN skin check completed with ASHWIN Dumont. Heels and sacrum red and blanching. Bruise to right hip. CEA incision with drain to left side of neck. Dressing CDI.

## 2020-05-10 NOTE — ANESTHESIA PROCEDURE NOTES
Arterial Line  Performed by: Natanael Castanon M.D.  Authorized by: Natanael Castanon M.D.     Start Time:  5/10/2020 11:35 AM  End Time:  5/10/2020 11:40 AM  Localization: surface landmarks    Patient Location:  OR  Indication: continuous blood pressure monitoring        Catheter Size:  20 G  Seldinger Technique?: Yes    Laterality:  Right  Site:  Radial artery  Line Secured:  Antimicrobial disc, tape and transparent dressing  Events: patient tolerated procedure well with no complications

## 2020-05-10 NOTE — PROGRESS NOTES
Hospital Medicine Daily Progress Note    Date of Service  5/9/2020    Chief Complaint  74 y.o. female admitted 5/7/2020 with dizziness      Interval Problem Update  Mri head negative for stroke    She is still dizzy    She is afebrile    Denies any headache    Carotid ultrasound shows greater than 70% of the left internal carotid artery      bp is elevated and uncontrolled    Case d//w dr gillis neuro    I have discussed case and consulted vascular surgery md dr sagastume    Consultants/Specialty  Neurology    vascular    Code Status    Full code  Disposition  Pending    Review of Systems  Review of Systems   Constitutional: Positive for malaise/fatigue. Negative for chills, fever and weight loss.   HENT: Negative for ear discharge, ear pain, hearing loss and tinnitus.    Eyes: Negative for blurred vision, double vision, photophobia and pain.   Respiratory: Negative for cough, hemoptysis and sputum production.    Cardiovascular: Negative for chest pain, palpitations, orthopnea and claudication.   Gastrointestinal: Negative for abdominal pain, heartburn, nausea and vomiting.   Genitourinary: Negative for dysuria, frequency and urgency.   Musculoskeletal: Negative for back pain, joint pain, myalgias and neck pain.   Neurological: Positive for dizziness and weakness. Negative for sensory change.   Psychiatric/Behavioral: Negative for depression and suicidal ideas. The patient is not nervous/anxious.         Physical Exam  Temp:  [36.2 °C (97.2 °F)-37.1 °C (98.8 °F)] 36.9 °C (98.5 °F)  Pulse:  [56-70] 70  Resp:  [16-24] 16  BP: (146-193)/(57-88) 171/85  SpO2:  [90 %-97 %] 96 %    Physical Exam  Constitutional:       General: She is not in acute distress.     Appearance: She is not ill-appearing, toxic-appearing or diaphoretic.      Comments: Thin   HENT:      Head: Normocephalic and atraumatic.      Nose: No rhinorrhea.      Mouth/Throat:      Pharynx: No posterior oropharyngeal erythema.   Eyes:      General: No scleral  icterus.     Extraocular Movements: Extraocular movements intact.      Pupils: Pupils are equal, round, and reactive to light.   Neck:      Musculoskeletal: Normal range of motion and neck supple.   Cardiovascular:      Rate and Rhythm: Normal rate and regular rhythm.      Pulses: Normal pulses.      Heart sounds: No murmur.   Pulmonary:      Effort: Pulmonary effort is normal. No respiratory distress.      Breath sounds: No stridor. No wheezing, rhonchi or rales.   Chest:      Chest wall: No tenderness.   Abdominal:      General: Abdomen is flat. There is no distension.      Palpations: There is no mass.      Tenderness: There is no abdominal tenderness. There is no guarding or rebound.      Hernia: No hernia is present.   Musculoskeletal: Normal range of motion.         General: No swelling, tenderness, deformity or signs of injury.      Right lower leg: No edema.      Left lower leg: No edema.   Skin:     General: Skin is warm.      Capillary Refill: Capillary refill takes 2 to 3 seconds.      Findings: No bruising or lesion.   Neurological:      General: No focal deficit present.      Mental Status: She is alert and oriented to person, place, and time.   Psychiatric:      Comments: Depressed mood         Fluids    Intake/Output Summary (Last 24 hours) at 5/9/2020 2045  Last data filed at 5/9/2020 1800  Gross per 24 hour   Intake 600 ml   Output --   Net 600 ml       Laboratory  Recent Labs     05/07/20 2036 05/08/20 0407 05/09/20 0433   WBC 6.4 7.3 5.7   RBC 4.41 4.92 4.92   HEMOGLOBIN 12.3 13.8 13.9   HEMATOCRIT 37.9 42.1 41.4   MCV 85.9 85.6 84.1   MCH 27.9 28.0 28.3   MCHC 32.5* 32.8* 33.6   RDW 42.4 43.4 42.2   PLATELETCT 139* 158* 145*   MPV 10.4 10.2 10.2     Recent Labs     05/07/20 2036 05/08/20 0407 05/09/20  0433   SODIUM 143 136 138   POTASSIUM 3.1* 3.8 4.0   CHLORIDE 115* 104 107   CO2 20 24 22   GLUCOSE 86 96 106*   BUN 11 11 15   CREATININE 0.53 0.75 0.75   CALCIUM 7.1* 9.0 9.1     Recent Labs      05/07/20 2036   APTT 35.8   INR 1.21*         Recent Labs     05/08/20  0407   TRIGLYCERIDE 108   HDL 36*   *       Imaging  MR-BRAIN-W/O   Final Result         1. Age-related cerebral atrophy.      2. Mild to moderate periventricular and juxtacortical white matter changes consistent with chronic microvascular ischemic gliosis.      3. Chronic left maxillary sinusitis.      EC-ECHOCARDIOGRAM COMPLETE W/O CONT   Final Result      US-CAROTID DOPPLER BILAT   Final Result      OUTSIDE IMAGES-CT HEAD   Final Result      DX-CHEST-PORTABLE (1 VIEW)   Final Result      No acute cardiopulmonary abnormality.      CT-CTA NECK WITH & W/O-POST PROCESSING   Final Result   Addendum 1 of 1   Neuro interventional radiology consult is advised for potential aneurysm    intervention.      Final      CT-CTA HEAD WITH & W/O-POST PROCESS   Final Result      1.  No large vessel occlusion or high-grade stenosis.   2.  A 3.5 mm aneurysm at the left MCA trifurcation.      CT-CEREBRAL PERFUSION ANALYSIS   Final Result      1.  Cerebral blood flow less than 30% likely representing completed infarct = 0 mL.      2.  T Max more than 6 seconds likely representing combination of completed infarct and ischemia = 0 mL.      3.  Mismatched volume likely representing ischemic brain/penumbra = 0 mL.      4.  Please note that the cerebral perfusion was performed on the limited brain tissue around the basal ganglia region. Infarct/ischemia outside the CT perfusion sections can be missed in this study.      CT-HEAD W/O   Final Result      1. No CT evidence of acute infarct, hemorrhage or mass.   2. Mild global parenchymal atrophy. Chronic small vessel ischemic changes.           Assessment/Plan  * Vertigo- (present on admission)  Assessment & Plan    CTA head and neck with evidence of left carotid stenosis 50-70%, small aneurysm on CTA head  Asa, plavix    Statin    Scopolamine patch started on 5/9      Severe protein-calorie malnutrition  (HCC)- (present on admission)  Assessment & Plan  Protein supplementation  Nutrition consultation     Hypokalemia- (present on admission)  Assessment & Plan  Replaced    Intracranial aneurysm- (present on admission)  Assessment & Plan  will need follow up    Tobacco abuse- (present on admission)  Assessment & Plan  Previously advised to stop smoking    HLD (hyperlipidemia)- (present on admission)  Assessment & Plan  High dose statin therapy       Carotid stenosis- (present on admission)  Assessment & Plan  Left ICA stenosis      CEA planned as per vascular md    Asa, plavix     HTN (hypertension)- (present on admission)  Assessment & Plan  Resume home bp meds        VTE prophylaxis: scd

## 2020-05-10 NOTE — CARE PLAN
Problem: Safety  Goal: Will remain free from injury  Outcome: PROGRESSING AS EXPECTED  Note: Fall precautions in place. Bed alarm/chair alarm in use. Freq rounding. Safety maintained.   Goal: Will remain free from falls  Outcome: PROGRESSING AS EXPECTED     Problem: Acute Care of the Stroke Patient  Goal: Optimal Outcome for the Stroke patient  Outcome: PROGRESSING AS EXPECTED  Note: Continue neuro checks q4 hrs. C/o blurred/ double vision, MD aware. -190's. Restarted NP meds. Plan for surg tomorrow. NPO at midnight. Family called and updated regarding POC.

## 2020-05-10 NOTE — ANESTHESIA PROCEDURE NOTES
Airway  Date/Time: 5/10/2020 11:32 AM  Performed by: Natanael Castanon M.D.  Authorized by: Natanael Castanon M.D.     Location:  OR  Urgency:  Elective  Indications for Airway Management:  Anesthesia      Spontaneous Ventilation: absent    Sedation Level:  Deep  Preoxygenated: Yes    Patient Position:  Sniffing  Final Airway Type:  Endotracheal airway  Final Endotracheal Airway:  ETT  Cuffed: Yes    Technique Used for Successful ETT Placement:  Direct laryngoscopy    Insertion Site:  Oral  Blade Type:  Jean-Pierre  Laryngoscope Blade/Videolaryngoscope Blade Size:  3  ETT Size (mm):  6.5  Measured from:  Teeth  ETT to Teeth (cm):  21  Placement Verified by: auscultation and capnometry    Cormack-Lehane Classification:  Grade I - full view of glottis  Number of Attempts at Approach:  1

## 2020-05-10 NOTE — PROGRESS NOTES
[facility] Medicine Daily Progress Note    Date of Service  5/10/2020    Chief Complaint  74 y.o. female admitted 5/7/2020 with dizziness      Interval Problem Update  Mri head negative for stroke    She is still dizzy despite scopolamine patch    She is afebrile    Denies any headache    Carotid ultrasound shows greater than 70% of the left internal carotid artery--> surgery today    She is NPO      bp is good    Consultants/Specialty  Neurology    vascular    Code Status    Full code  Disposition  Pending    Review of Systems  Review of Systems   Constitutional: Positive for malaise/fatigue. Negative for chills, fever and weight loss.   HENT: Negative for ear discharge, ear pain, hearing loss and tinnitus.    Eyes: Negative for blurred vision, double vision, photophobia and pain.   Respiratory: Negative for cough, hemoptysis and sputum production.    Cardiovascular: Negative for chest pain, palpitations, orthopnea and claudication.   Gastrointestinal: Negative for abdominal pain, heartburn, nausea and vomiting.   Genitourinary: Negative for dysuria, frequency and urgency.   Musculoskeletal: Negative for back pain, joint pain, myalgias and neck pain.   Neurological: Positive for dizziness and weakness. Negative for sensory change.   Psychiatric/Behavioral: Negative for depression and suicidal ideas. The patient is not nervous/anxious.         Physical Exam  Temp:  [36.3 °C (97.4 °F)-37.1 °C (98.8 °F)] 37 °C (98.6 °F)  Pulse:  [57-80] 70  Resp:  [16-28] 20  BP: (121-193)/(62-88) 121/66  SpO2:  [93 %-96 %] 93 %    Physical Exam  Constitutional:       General: She is not in acute distress.     Appearance: She is not ill-appearing, toxic-appearing or diaphoretic.      Comments: Thin   HENT:      Head: Normocephalic and atraumatic.      Nose: No rhinorrhea.      Mouth/Throat:      Pharynx: No posterior oropharyngeal erythema.   Eyes:      General: No scleral icterus.     Extraocular Movements: Extraocular movements  intact.      Pupils: Pupils are equal, round, and reactive to light.   Neck:      Musculoskeletal: Normal range of motion and neck supple.   Cardiovascular:      Rate and Rhythm: Normal rate and regular rhythm.      Pulses: Normal pulses.      Heart sounds: No murmur.   Pulmonary:      Effort: Pulmonary effort is normal. No respiratory distress.      Breath sounds: No stridor. No wheezing, rhonchi or rales.   Chest:      Chest wall: No tenderness.   Abdominal:      General: Abdomen is flat. There is no distension.      Palpations: There is no mass.      Tenderness: There is no abdominal tenderness. There is no guarding or rebound.      Hernia: No hernia is present.   Musculoskeletal: Normal range of motion.         General: No swelling, tenderness, deformity or signs of injury.      Right lower leg: No edema.      Left lower leg: No edema.   Skin:     General: Skin is warm.      Capillary Refill: Capillary refill takes 2 to 3 seconds.      Findings: No bruising or lesion.   Neurological:      General: No focal deficit present.      Mental Status: She is alert and oriented to person, place, and time.   Psychiatric:      Comments: Depressed mood         Fluids    Intake/Output Summary (Last 24 hours) at 5/10/2020 0909  Last data filed at 5/10/2020 0000  Gross per 24 hour   Intake 600 ml   Output --   Net 600 ml       Laboratory  Recent Labs     05/07/20 2036 05/08/20 0407 05/09/20  0433   WBC 6.4 7.3 5.7   RBC 4.41 4.92 4.92   HEMOGLOBIN 12.3 13.8 13.9   HEMATOCRIT 37.9 42.1 41.4   MCV 85.9 85.6 84.1   MCH 27.9 28.0 28.3   MCHC 32.5* 32.8* 33.6   RDW 42.4 43.4 42.2   PLATELETCT 139* 158* 145*   MPV 10.4 10.2 10.2     Recent Labs     05/07/20 2036 05/08/20 0407 05/09/20  0433   SODIUM 143 136 138   POTASSIUM 3.1* 3.8 4.0   CHLORIDE 115* 104 107   CO2 20 24 22   GLUCOSE 86 96 106*   BUN 11 11 15   CREATININE 0.53 0.75 0.75   CALCIUM 7.1* 9.0 9.1     Recent Labs     05/07/20 2036   APTT 35.8   INR 1.21*          Recent Labs     05/08/20  0407   TRIGLYCERIDE 108   HDL 36*   *       Imaging  MR-BRAIN-W/O   Final Result         1. Age-related cerebral atrophy.      2. Mild to moderate periventricular and juxtacortical white matter changes consistent with chronic microvascular ischemic gliosis.      3. Chronic left maxillary sinusitis.      EC-ECHOCARDIOGRAM COMPLETE W/O CONT   Final Result      US-CAROTID DOPPLER BILAT   Final Result      OUTSIDE IMAGES-CT HEAD   Final Result      DX-CHEST-PORTABLE (1 VIEW)   Final Result      No acute cardiopulmonary abnormality.      CT-CTA NECK WITH & W/O-POST PROCESSING   Final Result   Addendum 1 of 1   Neuro interventional radiology consult is advised for potential aneurysm    intervention.      Final      CT-CTA HEAD WITH & W/O-POST PROCESS   Final Result      1.  No large vessel occlusion or high-grade stenosis.   2.  A 3.5 mm aneurysm at the left MCA trifurcation.      CT-CEREBRAL PERFUSION ANALYSIS   Final Result      1.  Cerebral blood flow less than 30% likely representing completed infarct = 0 mL.      2.  T Max more than 6 seconds likely representing combination of completed infarct and ischemia = 0 mL.      3.  Mismatched volume likely representing ischemic brain/penumbra = 0 mL.      4.  Please note that the cerebral perfusion was performed on the limited brain tissue around the basal ganglia region. Infarct/ischemia outside the CT perfusion sections can be missed in this study.      CT-HEAD W/O   Final Result      1. No CT evidence of acute infarct, hemorrhage or mass.   2. Mild global parenchymal atrophy. Chronic small vessel ischemic changes.           Assessment/Plan  * Vertigo- (present on admission)  Assessment & Plan    CTA head and neck with evidence of left carotid stenosis 50-70%, small aneurysm on CTA head  Asa, plavix    Statin    Scopolamine patch started on 5/9      Severe protein-calorie malnutrition (HCC)- (present on admission)  Assessment &  Plan  Protein supplementation  Nutrition consultation     Hypokalemia- (present on admission)  Assessment & Plan  Replaced    Intracranial aneurysm- (present on admission)  Assessment & Plan  will need follow up    Tobacco abuse- (present on admission)  Assessment & Plan  Previously advised to stop smoking    HLD (hyperlipidemia)- (present on admission)  Assessment & Plan  High dose statin therapy       Carotid stenosis- (present on admission)  Assessment & Plan  Left ICA stenosis      CEA planned as per vascular md today    Asa, plavix     HTN (hypertension)- (present on admission)  Assessment & Plan  home bp meds        VTE prophylaxis: scd    Check am cbc, bmp

## 2020-05-10 NOTE — OP REPORT
05/10/20    OPERATIVE NOTE    PRE-OPERATIVE DIAGNOSIS -     1.  Symptomatic left carotid artery stenosis    POST-OPERATIVE DIAGNOSIS -same    PROCEDURE PERFORMED - Left carotid endarterectomy    SURGEON - Lino Oswald M.D.    ASSISTANT - HILLARY Singh    TYPE OF ANESTHESIA - General endotracheal with EEG monitoring    ANESTHESIOLOGIST  -Dr. Castanon      SPECIMENS -none    INDICATIONS - 74 y.o.      PROCEDURE IN DETAIL -     Patient was taken to the operating suite placed in the supine position.  After adequate anesthesia the patient's left neck was prepped and draped in sterile fashion.  Half percent Marcaine with epinephrine was infiltrated into the subcutaneous tissue along the anterior border of the sternocleidomastoid muscle.  An incision was made along the anterior border of the sternocleidomastoid muscle.  The incision was carried down through the subcutaneous tissue and through the platysma layer.  Dissection took place along the anterior aspect of the sternocleidomastoid muscle and the jugular vein was identified.  Dissection took place along the anterior aspect of the jugular vein and the facial vein was identified and ligated between ties.  The vein was retracted laterally exposing the common carotid artery.  The common carotid internal carotid and external carotid arteries were circumferentially dissected and isolated with Vesseloops.  A Joey tourniquet was placed around the common carotid artery.  5000 units of heparin was administered and after an appropriate period of time the vessels were sequentially clamped starting with the internal carotid.  A longitudinal arteriotomy was made in the common carotid artery and carried up onto the internal carotid artery using Chacon scissors.  The patient was noted to have a hemodynamically significant dense calcific plaque at the bifurcation and common carotid.  An endarterectomy was performed of the common carotid proximal internal carotid and an eversion  endarterectomy was performed on the external carotid.  After the area was free of all atheromatous debris a bovine patch was sewn in circumferentially using a 5-0 Prolene suture in a running fashion.  Just prior to completing the patch all vessels were vigorously flushed and irrigated.  The carotid patch was then completed and flow was established first to the external carotid and subsequently to the internal carotid artery.  There was no EEG change at the time of carotid crossclamping and therefore shunting was not employed.  Once hemostasis was assured a 10 flat fluted drain was placed within the neck and brought out through a separate stab incision.  That drain was secured to the skin using a 3-0 nylon suture.  2-0 Vicryl interrupted sutures were used for the platysmal layer and a 4-0 strata fix suture was then used to reapproximate the skin incision.  Sterile dressings were applied.  30 mg of protamine was administered at the completion of the case.      Lino Oswald M.D.

## 2020-05-10 NOTE — ANESTHESIA QCDR
2019 [facility] Clinical Data Registry (for Quality Improvement)     Postoperative nausea/vomiting risk protocol (Adult = 18 yrs and Pediatric 3-17 yrs)- (430 and 463)  General inhalation anesthetic (NOT TIVA) with PONV risk factors: Yes  Provision of anti-emetic therapy with at least 2 different classes of agents: Yes   Patient DID NOT receive anti-emetic therapy and reason is documented in Medical Record:  N/A    Multimodal Pain Management- (477)  Non-emergent surgery AND patient age >= 18:   Use of Multimodal Pain Management, two or more drugs and/or interventions, NOT including systemic opioids:   Exception: Documented allergy to multiple classes of analgesics:     Smoking Abstinence (404)  Patient is current smoker (cigarette, pipe, e-cig, marijuanna):   Elective Surgery:   Abstinence instructions provided prior to day of surgery:   Patient abstained from smoking on day of surgery:     Pre-Op Beta-Blocker in Isolated CABG (44)  Isolated CABG AND patient age >= 18:   Beta-blocker admin within 24 hours of surgical incision:   Exception:of medical reason(s) for not administering beta blocker within 24 hours prior to surgical incision (e.g., not  indicated,other medical reason):     PACU assessment of acute postoperative pain prior to Anesthesia Care End- Applies to Patients Age = 18- (ABG7)  Initial PACU pain score is which of the following: < 7/10  Patient unable to report pain score: N/A    Post-anesthetic transfer of care checklist/protocol to PACU/ICU- (426 and 427)  Upon conclusion of case, patient transferred to which of the following locations: PACU/Non-ICU  Use of transfer checklist/protocol:   Exclusion: Service Performed in Patient Hospital Room (and thus did not require transfer):   Unplanned admission to ICU related to anesthesia service up through end of PACU care- (MD51)  Unplanned admission to ICU (not initially anticipated at anesthesia start time): No

## 2020-05-10 NOTE — THERAPY
Speech Therapy Contact Note: Attempted to see pt for swallow re-evaluation. Per RN, pt is currently NPO for procedure. SLP to hold this date and return when pt appropriate.

## 2020-05-10 NOTE — ANESTHESIA POSTPROCEDURE EVALUATION
Patient: Yeny Kirk    Procedure Summary     Date:  05/10/20 Room / Location:  [facility] 09 / SURGERY [facility]    Anesthesia Start:  1132 Anesthesia Stop:      Procedure:  ENDARTERECTOMY, CAROTID EEG (Left Neck) Diagnosis:  (A 3.5 mm aneurysm at the left MCA trifurcation)    Surgeon:  Lino Oswald M.D. Responsible Provider:  Natanael Castanon M.D.    Anesthesia Type:  general ASA Status:  3          Final Anesthesia Type: general  Last vitals  BP   Blood Pressure : 146/76    Temp   36.8 °C (98.3 °F)    Pulse   Pulse: 64   Resp   20    SpO2   96 %      Anesthesia Post Evaluation    Patient location during evaluation: PACU  Patient participation: complete - patient participated  Level of consciousness: awake and alert  Pain score: 2    Airway patency: patent  Anesthetic complications: no  Cardiovascular status: hemodynamically stable  Respiratory status: acceptable  Hydration status: euvolemic    PONV: none           Nurse Pain Score: 0 (NPRS)

## 2020-05-11 PROCEDURE — A9270 NON-COVERED ITEM OR SERVICE: HCPCS | Performed by: HOSPITALIST

## 2020-05-11 PROCEDURE — 97116 GAIT TRAINING THERAPY: CPT

## 2020-05-11 PROCEDURE — 770020 HCHG ROOM/CARE - TELE (206)

## 2020-05-11 PROCEDURE — 99232 SBSQ HOSP IP/OBS MODERATE 35: CPT | Performed by: HOSPITALIST

## 2020-05-11 PROCEDURE — 700102 HCHG RX REV CODE 250 W/ 637 OVERRIDE(OP): Performed by: HOSPITALIST

## 2020-05-11 PROCEDURE — 92526 ORAL FUNCTION THERAPY: CPT

## 2020-05-11 PROCEDURE — 97535 SELF CARE MNGMENT TRAINING: CPT | Mod: CO

## 2020-05-11 RX ADMIN — SENNOSIDES AND DOCUSATE SODIUM 2 TABLET: 8.6; 5 TABLET ORAL at 16:20

## 2020-05-11 RX ADMIN — VERAPAMIL HYDROCHLORIDE 40 MG: 80 TABLET, FILM COATED ORAL at 04:06

## 2020-05-11 RX ADMIN — SENNOSIDES AND DOCUSATE SODIUM 2 TABLET: 8.6; 5 TABLET ORAL at 04:05

## 2020-05-11 RX ADMIN — ATORVASTATIN CALCIUM 40 MG: 40 TABLET, FILM COATED ORAL at 16:20

## 2020-05-11 RX ADMIN — NICOTINE 7 MG: 7 PATCH TRANSDERMAL at 04:06

## 2020-05-11 RX ADMIN — METOPROLOL SUCCINATE 25 MG: 25 TABLET, EXTENDED RELEASE ORAL at 04:05

## 2020-05-11 RX ADMIN — ACETAMINOPHEN 650 MG: 325 TABLET, FILM COATED ORAL at 04:05

## 2020-05-11 RX ADMIN — CLOPIDOGREL BISULFATE 75 MG: 75 TABLET ORAL at 04:06

## 2020-05-11 RX ADMIN — ASPIRIN 325 MG: 325 TABLET, FILM COATED ORAL at 04:06

## 2020-05-11 RX ADMIN — LOSARTAN POTASSIUM 50 MG: 50 TABLET, FILM COATED ORAL at 04:05

## 2020-05-11 ASSESSMENT — GAIT ASSESSMENTS
DEVIATION: DECREASED BASE OF SUPPORT
DISTANCE (FEET): 300
GAIT LEVEL OF ASSIST: MINIMAL ASSIST

## 2020-05-11 ASSESSMENT — ENCOUNTER SYMPTOMS
ABDOMINAL PAIN: 0
COUGH: 0
VOMITING: 0
PALPITATIONS: 0
DOUBLE VISION: 0
NAUSEA: 0
MYALGIAS: 0
DIZZINESS: 1
HEMOPTYSIS: 0
PHOTOPHOBIA: 0
SPUTUM PRODUCTION: 0
HEARTBURN: 0
ORTHOPNEA: 0
SENSORY CHANGE: 0
WEIGHT LOSS: 0
NECK PAIN: 0
BACK PAIN: 0
WEAKNESS: 0
FEVER: 0
EYE PAIN: 0
NERVOUS/ANXIOUS: 0
CHILLS: 0
BLURRED VISION: 0
CLAUDICATION: 0
DEPRESSION: 0

## 2020-05-11 ASSESSMENT — COGNITIVE AND FUNCTIONAL STATUS - GENERAL
DAILY ACTIVITIY SCORE: 22
DRESSING REGULAR LOWER BODY CLOTHING: A LITTLE
WALKING IN HOSPITAL ROOM: A LITTLE
STANDING UP FROM CHAIR USING ARMS: A LITTLE
MOBILITY SCORE: 19
CLIMB 3 TO 5 STEPS WITH RAILING: A LITTLE
SUGGESTED CMS G CODE MODIFIER DAILY ACTIVITY: CJ
MOVING TO AND FROM BED TO CHAIR: A LITTLE
MOVING FROM LYING ON BACK TO SITTING ON SIDE OF FLAT BED: A LITTLE
SUGGESTED CMS G CODE MODIFIER MOBILITY: CK
HELP NEEDED FOR BATHING: A LITTLE

## 2020-05-11 ASSESSMENT — FIBROSIS 4 INDEX: FIB4 SCORE: 2.33

## 2020-05-11 ASSESSMENT — PAIN SCALES - WONG BAKER: WONGBAKER_NUMERICALRESPONSE: DOESN'T HURT AT ALL

## 2020-05-11 NOTE — DISCHARGE PLANNING
In the case of an emergency, pt's legal NOK is: Ian Kirk----[phone]     RNCM contacted pt via phone and obtained the following information used in this assessment. Pt verified accuracy of facesheet. Pt's lives in a mobile home with her  and adult son Dmitriy. Pt uses [facility] in [place], [place]. Prior to current hospitalization pt was independent in ADLS/IADLS. Pt's  or son drives pt to get to necessary MD appointments. Pt's PCP is Geovanna Lemus. Pt denies financial concerns. Pt has a strong support system and verbalizes that she has 2 RN family members that are her daughter in law and niece. Pt denies any hx of substance abuse. Pt denies mental health hx.    Upon discharge patient states that his/her dtr in law will provide discharge transportation to home.    Care Transition Team Assessment    Information Source  Orientation : Oriented x 4  Information Given By: Patient  Informant's Name: Yeny  Who is responsible for making decisions for patient? : Patient    Readmission Evaluation  Is this a readmission?: No    Elopement Risk  Legal Hold: No  Ambulatory or Self Mobile in Wheelchair: No-Not an Elopement Risk  Disoriented: No  Psychiatric Symptoms: None  History of Wandering: No  Elopement this Admit: No  Vocalizing Wanting to Leave: No  Displays Behaviors, Body Language Wanting to Leave: No-Not at Risk for Elopement  Elopement Risk: Not at Risk for Elopement    Interdisciplinary Discharge Planning  Does Admitting Nurse Feel This Could be a Complex Discharge?: No  Primary Care Physician: Dr. Geovanna Montez  Lives with - Patient's Self Care Capacity: Spouse, Adult Children  Patient or legal guardian wants to designate a caregiver (see row info): No  Support Systems: Family Member(s), Spouse / Significant Other  Housing / Facility: Mobile Home  Do You Take your Prescribed Medications Regularly: Yes  Able to Return to Previous ADL's: Yes  Mobility Issues: No  Prior Services:  None  Patient Expects to be Discharged to:: Home  Assistance Needed: Yes  Durable Medical Equipment: (None)    Discharge Preparedness  What is your plan after discharge?: Home with help  What are your discharge supports?: Child, Spouse  Prior Functional Level: Ambulatory, Independent with Activities of Daily Living, Independent with Medication Management  Difficulity with ADLs: None  Difficulity with IADLs: None    Functional Assesment  Prior Functional Level: Ambulatory, Independent with Activities of Daily Living, Independent with Medication Management    Finances  Financial Barriers to Discharge: No  Prescription Coverage: Yes    Vision / Hearing Impairment  Vision Impairment : Yes  Right Eye Vision: Impaired, Wears Glasses  Left Eye Vision: Impaired, Wears Glasses  Hearing Impairment : No         Advance Directive  Advance Directive?: None    Domestic Abuse  Have you ever been the victim of abuse or violence?: No  Physical Abuse or Sexual Abuse: No  Verbal Abuse or Emotional Abuse: No  Possible Abuse Reported to:: Not Applicable    Psychological Assessment  History of Substance Abuse: None  History of Psychiatric Problems: No    Discharge Risks or Barriers  Discharge risks or barriers?: No  Patient risk factors: (None)    Anticipated Discharge Information  Anticipated discharge disposition: Home  Discharge Address: [address] [place] [address]  Discharge Contact Phone Number: [phone]

## 2020-05-11 NOTE — PROGRESS NOTES
[facility] Medicine Daily Progress Note    Date of Service  5/11/2020    Chief Complaint  74 y.o. female admitted 5/7/2020 with dizziness      Interval Problem Update  Mri head negative for stroke    Status post carotid enterectomy    Patient states dizziness is improved    PT and OT is recommending skilled referral    She is afebrile    Monitoring BP to see whether not we need to further adjust BP medication    Consultants/Specialty  Neurology    vascular    Code Status    Full code  Disposition  SNF    Review of Systems  Review of Systems   Constitutional: Negative for chills, fever, malaise/fatigue and weight loss.   HENT: Negative for ear discharge, ear pain, hearing loss and tinnitus.    Eyes: Negative for blurred vision, double vision, photophobia and pain.   Respiratory: Negative for cough, hemoptysis and sputum production.    Cardiovascular: Negative for chest pain, palpitations, orthopnea and claudication.   Gastrointestinal: Negative for abdominal pain, heartburn, nausea and vomiting.   Genitourinary: Negative for dysuria, frequency and urgency.   Musculoskeletal: Negative for back pain, joint pain, myalgias and neck pain.   Neurological: Positive for dizziness (improved). Negative for sensory change and weakness.   Psychiatric/Behavioral: Negative for depression and suicidal ideas. The patient is not nervous/anxious.         Physical Exam  Temp:  [36.2 °C (97.2 °F)-36.8 °C (98.2 °F)] 36.7 °C (98.1 °F)  Pulse:  [63-82] 70  Resp:  [12-20] 16  BP: (116-177)/(54-90) 140/66  SpO2:  [88 %-100 %] 93 %    Physical Exam  Constitutional:       General: She is not in acute distress.     Appearance: She is not ill-appearing, toxic-appearing or diaphoretic.      Comments: Thin   HENT:      Head: Normocephalic and atraumatic.      Nose: No rhinorrhea.      Mouth/Throat:      Pharynx: No posterior oropharyngeal erythema.   Eyes:      General: No scleral icterus.     Extraocular Movements: Extraocular movements intact.       Pupils: Pupils are equal, round, and reactive to light.   Neck:      Musculoskeletal: Normal range of motion and neck supple.   Cardiovascular:      Rate and Rhythm: Normal rate and regular rhythm.      Pulses: Normal pulses.      Heart sounds: No murmur.   Pulmonary:      Effort: Pulmonary effort is normal. No respiratory distress.      Breath sounds: No stridor. No wheezing, rhonchi or rales.   Chest:      Chest wall: No tenderness.   Abdominal:      General: Abdomen is flat. There is no distension.      Palpations: There is no mass.      Tenderness: There is no abdominal tenderness. There is no guarding or rebound.      Hernia: No hernia is present.   Musculoskeletal: Normal range of motion.         General: No swelling, tenderness, deformity or signs of injury.      Right lower leg: No edema.      Left lower leg: No edema.   Skin:     General: Skin is warm.      Capillary Refill: Capillary refill takes 2 to 3 seconds.      Findings: No bruising or lesion.   Neurological:      General: No focal deficit present.      Mental Status: She is alert and oriented to person, place, and time.   Psychiatric:      Comments: Depressed mood         Fluids    Intake/Output Summary (Last 24 hours) at 5/11/2020 1150  Last data filed at 5/11/2020 0800  Gross per 24 hour   Intake 1100 ml   Output 50 ml   Net 1050 ml       Laboratory  Recent Labs     05/09/20  0433   WBC 5.7   RBC 4.92   HEMOGLOBIN 13.9   HEMATOCRIT 41.4   MCV 84.1   MCH 28.3   MCHC 33.6   RDW 42.2   PLATELETCT 145*   MPV 10.2     Recent Labs     05/09/20  0433   SODIUM 138   POTASSIUM 4.0   CHLORIDE 107   CO2 22   GLUCOSE 106*   BUN 15   CREATININE 0.75   CALCIUM 9.1                   Imaging  MR-BRAIN-W/O   Final Result         1. Age-related cerebral atrophy.      2. Mild to moderate periventricular and juxtacortical white matter changes consistent with chronic microvascular ischemic gliosis.      3. Chronic left maxillary sinusitis.      EC-ECHOCARDIOGRAM  COMPLETE W/O CONT   Final Result      US-CAROTID DOPPLER BILAT   Final Result      OUTSIDE IMAGES-CT HEAD   Final Result      DX-CHEST-PORTABLE (1 VIEW)   Final Result      No acute cardiopulmonary abnormality.      CT-CTA NECK WITH & W/O-POST PROCESSING   Final Result   Addendum 1 of 1   Neuro interventional radiology consult is advised for potential aneurysm    intervention.      Final      CT-CTA HEAD WITH & W/O-POST PROCESS   Final Result      1.  No large vessel occlusion or high-grade stenosis.   2.  A 3.5 mm aneurysm at the left MCA trifurcation.      CT-CEREBRAL PERFUSION ANALYSIS   Final Result      1.  Cerebral blood flow less than 30% likely representing completed infarct = 0 mL.      2.  T Max more than 6 seconds likely representing combination of completed infarct and ischemia = 0 mL.      3.  Mismatched volume likely representing ischemic brain/penumbra = 0 mL.      4.  Please note that the cerebral perfusion was performed on the limited brain tissue around the basal ganglia region. Infarct/ischemia outside the CT perfusion sections can be missed in this study.      CT-HEAD W/O   Final Result      1. No CT evidence of acute infarct, hemorrhage or mass.   2. Mild global parenchymal atrophy. Chronic small vessel ischemic changes.           Assessment/Plan  * Vertigo- (present on admission)  Assessment & Plan    CTA head and neck with evidence of left carotid stenosis 50-70%, small aneurysm on CTA head.. Status post CEA  plavix    Statin    Scopolamine patch started on 5/9      Severe protein-calorie malnutrition (HCC)- (present on admission)  Assessment & Plan  Protein supplementation  Nutrition consultation     Hypokalemia- (present on admission)  Assessment & Plan  Replaced    Intracranial aneurysm- (present on admission)  Assessment & Plan  will need follow up    Tobacco abuse- (present on admission)  Assessment & Plan  Previously advised to stop smoking    HLD (hyperlipidemia)- (present on  admission)  Assessment & Plan  High dose statin therapy       Carotid stenosis- (present on admission)  Assessment & Plan  Left ICA stenosis      CEA completed on May 10th     plavix     HTN (hypertension)- (present on admission)  Assessment & Plan  home bp meds        VTE prophylaxis: scd  Referred to SNF

## 2020-05-11 NOTE — CARE PLAN
Problem: Communication  Goal: The ability to communicate needs accurately and effectively will improve  Outcome: PROGRESSING AS EXPECTED     Problem: Safety  Goal: Will remain free from injury  Outcome: PROGRESSING AS EXPECTED  Goal: Will remain free from falls  Outcome: PROGRESSING AS EXPECTED     Problem: Respiratory:  Goal: Respiratory status will improve  Outcome: PROGRESSING AS EXPECTED     Problem: Skin Integrity  Goal: Skin Integrity is maintained or improved  Outcome: PROGRESSING AS EXPECTED

## 2020-05-11 NOTE — PROGRESS NOTES
Monitor summary: SR 66-96, MA .16, QRS .10, QT .40 with rare PVCs per strip from monitor room.

## 2020-05-11 NOTE — CARE PLAN
Problem: Safety  Goal: Will remain free from falls  Outcome: PROGRESSING AS EXPECTED  Note: Fall precautions in place.     Problem: Infection  Goal: Will remain free from infection  Outcome: PROGRESSING AS EXPECTED  Note: Educated on hand washing and swallowing techniques.

## 2020-05-11 NOTE — THERAPY
"Occupational Therapy Treatment completed with focus on ADLs, ADL transfers and patient education.  Functional Status:  Pt seen for OT tx. Supv supine > sit, min A sit > stand, supv LB dressing while long sitting in bed. Min A amb w/o AD in room to BR, completed toilet transfer w/ min A for balance and safety. Pt w/ 2 LOB during session but able to self correct. Min A light grooming at sink for standing balance. Attempted to discussed home safety w/ pt regarding possible DME to increase independence in ADLs and ADL transfers. Pt receptive to education but continues to have impaired safety awareness. Continues to be limited by impaired safety awareness, poor insight, inattention and generalized weakness. Will continue to benefit from OT services while in house to increase strength, endurance and independence in ADLs and ADL transfers.   Plan of Care: Will benefit from Occupational Therapy 4 times per week  Discharge Recommendations:  Equipment Will Continue to Assess for Equipment Needs. Post-acute therapy Recommend post-acute placement for additional occupational therapy services prior to discharge home. Should pt decline placement, recommend 24/7 supv and assistance upon d/c home.     See \"Rehab Therapy-Acute\" Patient Summary Report for complete documentation.   "

## 2020-05-11 NOTE — PROGRESS NOTES
Vascular Surgery     Doing well after CEA   Neurologically unchanged     Feels better    Incision OK     Drain - minimal     D/C RICARDO drain     OK to disposition from my standpoint   ASA or Plavix OK with me.      Will Follow-up 2 weeks my office    Lino Oswald MD

## 2020-05-11 NOTE — THERAPY
"Speech Language Therapy dysphagia treatment completed.     Functional Status: Pt s/p left carotid endarterectomy on 5/10. Patient with mild dysarthria characterized by reduced speech rate. Significant improvement in DOUGLAS compared to initial evaluation. PO consisted of items from PU4/MT2 meal tray in addition to PO trials of thin liquids, soft and regular textures in mixed consistency form, and dry solids. No overt s/sx of aspiration across all trials tested. Vocal quality clear all session. Mastication mildly reduced but overall appeared functional with all solids tested today.     Recommendations: Patient appears at the level to upgrade to a regular, thin liquid diet with monitoring. SLP following to ensure diet tolerance and complete orders for cognitive evaluation.     Plan of Care: Will benefit from Speech Therapy 3 times per week    Post-Acute Therapy: Recommend inpatient transitional care services for continued speech therapy services.      See \"Rehab Therapy-Acute\" Patient Summary Report for complete documentation.     "

## 2020-05-11 NOTE — THERAPY
"Physical Therapy Treatment completed.   Bed Mobility:  Supine to Sit: Supervised  Transfers: Sit to Stand: Supervised  Gait: Level Of Assist: Minimal Assist with No Equipment Needed       Plan of Care: Will benefit from Physical Therapy 4 times per week  Discharge Recommendations: Equipment: No Equipment Needed. Post-acute therapy Continuing to recommend discharge to a transitional care facility for continued skilled therapy services. If pt is discharge home with family, pt will require 24/7 assist and HHS in order to decrease fall risk.      Pt received up in room with OT. Pt agreeable to work with PT. Pt reported some improvement in eye sight. Pt ambulated 300ft + with no AD and min assist. Pt ambulated with narrow JETT and occasional scissoring. Pt with several small LOB and reaching for the wall for support. Pt reports typically ambulating close to walls at home. Pt negotiated 3 steps with 1HR and close SPV. Pt stated she will have a lot of assist when she gets home from family. Pt left in bed with bed alarm on and call light within reach. PT will continue to work with patient while in acute care. Continuing to recommend discharge to a transitional care facility for continued skilled therapy services. If pt is discharge home with family, pt will require 24/7 assist  And HHS in order to decrease fall risk.     See \"Rehab Therapy-Acute\" Patient Summary Report for complete documentation.     Yessica Maloney PT, DPT  Pager [phone]    "

## 2020-05-12 ENCOUNTER — PATIENT OUTREACH (OUTPATIENT)
Dept: HEALTH INFORMATION MANAGEMENT | Facility: OTHER | Age: 75
End: 2020-05-12

## 2020-05-12 VITALS
HEART RATE: 59 BPM | DIASTOLIC BLOOD PRESSURE: 72 MMHG | OXYGEN SATURATION: 96 % | WEIGHT: 111.33 LBS | SYSTOLIC BLOOD PRESSURE: 140 MMHG | HEIGHT: 64 IN | TEMPERATURE: 97.4 F | BODY MASS INDEX: 19.01 KG/M2 | RESPIRATION RATE: 17 BRPM

## 2020-05-12 PROBLEM — E87.6 HYPOKALEMIA: Status: RESOLVED | Noted: 2020-05-07 | Resolved: 2020-05-12

## 2020-05-12 PROBLEM — R42 VERTIGO: Status: RESOLVED | Noted: 2020-05-07 | Resolved: 2020-05-12

## 2020-05-12 PROCEDURE — 700102 HCHG RX REV CODE 250 W/ 637 OVERRIDE(OP): Performed by: HOSPITALIST

## 2020-05-12 PROCEDURE — A9270 NON-COVERED ITEM OR SERVICE: HCPCS | Performed by: HOSPITALIST

## 2020-05-12 PROCEDURE — 99239 HOSP IP/OBS DSCHRG MGMT >30: CPT | Performed by: INTERNAL MEDICINE

## 2020-05-12 RX ORDER — CLOPIDOGREL BISULFATE 75 MG/1
75 TABLET ORAL DAILY
Qty: 30 TAB | Refills: 1 | Status: SHIPPED | OUTPATIENT
Start: 2020-05-13

## 2020-05-12 RX ORDER — ATORVASTATIN CALCIUM 40 MG/1
40 TABLET, FILM COATED ORAL EVERY EVENING
Qty: 30 TAB | Refills: 1 | Status: SHIPPED | OUTPATIENT
Start: 2020-05-12

## 2020-05-12 RX ADMIN — NICOTINE 7 MG: 7 PATCH TRANSDERMAL at 04:34

## 2020-05-12 RX ADMIN — LOSARTAN POTASSIUM 50 MG: 50 TABLET, FILM COATED ORAL at 04:34

## 2020-05-12 RX ADMIN — CLOPIDOGREL BISULFATE 75 MG: 75 TABLET ORAL at 04:34

## 2020-05-12 RX ADMIN — METOPROLOL SUCCINATE 25 MG: 25 TABLET, EXTENDED RELEASE ORAL at 04:33

## 2020-05-12 RX ADMIN — SENNOSIDES AND DOCUSATE SODIUM 2 TABLET: 8.6; 5 TABLET ORAL at 04:34

## 2020-05-12 RX ADMIN — VERAPAMIL HYDROCHLORIDE 40 MG: 80 TABLET, FILM COATED ORAL at 04:33

## 2020-05-12 ASSESSMENT — FIBROSIS 4 INDEX: FIB4 SCORE: 2.33

## 2020-05-12 NOTE — CARE PLAN
Problem: Communication  Goal: The ability to communicate needs accurately and effectively will improve  5/12/2020 1323 by Evens Maloney R.N.  Outcome: MET  5/12/2020 1035 by Evens Maloney R.N.  Outcome: PROGRESSING AS EXPECTED     Problem: Safety  Goal: Will remain free from injury  5/12/2020 1323 by Evens Maloney R.N.  Outcome: MET  5/12/2020 1035 by Evens Maloney R.N.  Outcome: PROGRESSING AS EXPECTED  Goal: Will remain free from falls  5/12/2020 1323 by Evens Maloney R.N.  Outcome: MET  5/12/2020 1035 by Evens Maloney R.N.  Outcome: PROGRESSING AS EXPECTED     Problem: Infection  Goal: Will remain free from infection  Outcome: MET     Problem: Venous Thromboembolism (VTW)/Deep Vein Thrombosis (DVT) Prevention:  Goal: Patient will participate in Venous Thrombosis (VTE)/Deep Vein Thrombosis (DVT)Prevention Measures  Outcome: MET     Problem: Bowel/Gastric:  Goal: Normal bowel function is maintained or improved  Outcome: MET  Goal: Will not experience complications related to bowel motility  Outcome: MET     Problem: Knowledge Deficit  Goal: Knowledge of disease process/condition, treatment plan, diagnostic tests, and medications will improve  5/12/2020 1323 by Evens Maloney R.N.  Outcome: MET  5/12/2020 1035 by Evens Maloney R.N.  Outcome: PROGRESSING AS EXPECTED  Goal: Knowledge of the prescribed therapeutic regimen will improve  5/12/2020 1323 by Evens Maloney R.N.  Outcome: MET  5/12/2020 1035 by Evens Maolney R.N.  Outcome: PROGRESSING AS EXPECTED     Problem: Discharge Barriers/Planning  Goal: Patient's continuum of care needs will be met  5/12/2020 1323 by Evens Maloney R.N.  Outcome: MET  5/12/2020 1035 by Evens Maloney R.N.  Outcome: PROGRESSING AS EXPECTED     Problem: Fluid Volume:  Goal: Will maintain balanced intake and output  Outcome: MET     Problem: Respiratory:  Goal: Respiratory status will improve  Outcome: MET     Problem: Urinary Elimination:  Goal: Ability to reestablish a normal urinary elimination  pattern will improve  Outcome: MET     Problem: Acute Care of the Stroke Patient  Goal: Optimal Outcome for the Stroke patient  Outcome: MET     Problem: Safety  Goal: Free from accidental injury  Outcome: MET  Goal: Free from intentional harm  Outcome: MET  Goal: Free from self harm  Outcome: MET  Goal: Isolation Precautions for patient and staff safety  Outcome: MET     Problem: Knowledge Deficit  Goal: Patient/Significant other demonstrates understanding of disease process, treatment plan, medications and discharge instructions  Outcome: MET     Problem: Psychosocial needs  Goal: Spiritual needs incorporated in hospitalization  Outcome: MET  Goal: Cultural needs incorporated in hospitalization  Outcome: MET  Goal: Anxiety reduction  Outcome: MET     Problem: Discharge Barriers/Planning  Goal: Patient's Continuum of care needs are met  Outcome: MET     Problem: Skin Integrity  Goal: Skin Integrity is maintained or improved  Outcome: MET     Problem: Risk for Infection, Impaired Wound Healing  Goal: Remain free from signs and symptoms of infection  Outcome: MET  Goal: Promotion of Wound Healing and Drain Management  Outcome: MET     Problem: Risk for Impaired Mobility--Activity Intolerance  Goal: Mobilize and/or Transfer Safely with Maximum [place]  Outcome: MET  Goal: Activity Level appropriate for Discharge or Transfer  Outcome: MET     Problem: Oxygenation/Respiratory Function  Goal: Patient will Achieve/Maintain Optimum Respiratory Rate/Effort  Outcome: MET     Problem: Risk of Aspiration  Goal: Absence of aspiration  Outcome: MET     Problem: Pain  Goal: Alleviation of Pain or a reduction in pain to the patient's comfort goal  Outcome: MET     Problem: Hemodynamic Status  Goal: Stable Vital Signs and Fluid Balance  Outcome: MET     Problem: Risk for Deep Vein Thrombosis/Venous Thromboembolism  Goal: DVT/VTE Prevention Measures in Place  Outcome: MET  Goal: Patient participates in DVT/VTE Prevention  Measures  Outcome: MET     Problem: Nutrition Deficit  Goal: Adequate Food and Fluid Intake  Outcome: MET     Problem: Self Care Deficit  Goal: Ability to bathe, groom and dress independently or with assistance  Outcome: MET  Goal: Ability to feed and maintain oral hygiene independently or with assistance  Outcome: MET  Goal: Ability to toilet independently or with assistance  Outcome: MET     Problem: Elimination  Goal: Establishment and Maintenance of regular bowel elimination  Outcome: MET  Goal: Regular urinary elimination  Outcome: MET     Problem: Risk for injury related to physical restraint use  Goal: Safe and appropriate use of physical restraints. Restraints discontinued at the earliest possibility while ensuring patient safety.  Outcome: MET     Problem: Pain Management  Goal: Pain level will decrease to patient's comfort goal  Outcome: MET

## 2020-05-12 NOTE — CARE PLAN
Problem: Nutritional:  Goal: Achieve adequate nutritional intake  Description: Patient will consume 50% of meals/supplements   Outcome: PROGRESSING SLOWER THAN EXPECTED    Pt off floor during visit. Per ADLs, pt last documented eating 50-75% on 5/8-5/9. RN reports eating <50% of meals and only interested in water. RN unsure if pt drinking Boost Plus or magic cup. SLP last seen yesterday and diet upgraded to regular, thin liquids.    RD to follow.

## 2020-05-12 NOTE — PROGRESS NOTES
Monitor summary: SR 74-89, MI .16, QRS .08, QT .36 with rare PVCs per strip from monitor room.

## 2020-05-12 NOTE — DISCHARGE INSTRUCTIONS
Discharge Instructions    Discharged to home by car with relative. Discharged via wheelchair, hospital escort: Yes.  Special equipment needed: Not Applicable    Be sure to schedule a follow-up appointment with your primary care doctor or any specialists as instructed.     Discharge Plan:   Diet Plan: Discussed  Activity Level: Discussed  Smoking Cessation Offered: Patient Counseled  Confirmed Follow up Appointment: Patient to Call and Schedule Appointment  Confirmed Symptoms Management: Discussed  Medication Reconciliation Updated: Yes    I understand that a diet low in cholesterol, fat, and sodium is recommended for good health. Unless I have been given specific instructions below for another diet, I accept this instruction as my diet prescription.   Other diet: regular    Special Instructions: None    · Is patient discharged on Warfarin / Coumadin?   No     Depression / Suicide Risk    As you are discharged from this [facility] facility, it is important to learn how to keep safe from harming yourself.    Recognize the warning signs:  · Abrupt changes in personality, positive or negative- including increase in energy   · Giving away possessions  · Change in eating patterns- significant weight changes-  positive or negative  · Change in sleeping patterns- unable to sleep or sleeping all the time   · Unwillingness or inability to communicate  · Depression  · Unusual sadness, discouragement and loneliness  · Talk of wanting to die  · Neglect of personal appearance   · Rebelliousness- reckless behavior  · Withdrawal from people/activities they love  · Confusion- inability to concentrate     If you or a loved one observes any of these behaviors or has concerns about self-harm, here's what you can do:  · Talk about it- your feelings and reasons for harming yourself  · Remove any means that you might use to hurt yourself (examples: pills, rope, extension cords, firearm)  · Get professional help from the community  (Mental Health, Substance Abuse, psychological counseling)  · Do not be alone:Call your Safe Contact- someone whom you trust who will be there for you.  · Call your local CRISIS HOTLINE 666-4763 or 439-557-2555  · Call your local Children's Mobile Crisis Response Team [place] (154) 722-8593 or www.[employer]  · Call the toll free National Suicide Prevention Hotlines   · National Suicide Prevention Lifeline 077-711-JDBK (7437)  · [employer] Line Network 800-SUICIDE (721-5150)    DISCHARGE INSTRUCTIONS PER Chanel Rivera M.D.    Diagnosis: Symptomatic left carotid artery stenosis    Please follow up with primary care provider and with vascular surgery Dr. Oswald    Please take medications as prescribed.     For any medical emergency please go to the nearest emergency room or call [phone].

## 2020-05-12 NOTE — CARE PLAN
Problem: Communication  Goal: The ability to communicate needs accurately and effectively will improve  Outcome: PROGRESSING AS EXPECTED     Problem: Safety  Goal: Will remain free from injury  Outcome: PROGRESSING AS EXPECTED  Goal: Will remain free from falls  Outcome: PROGRESSING AS EXPECTED     Problem: Discharge Barriers/Planning  Goal: Patient's continuum of care needs will be met  Outcome: PROGRESSING AS EXPECTED

## 2020-05-13 NOTE — DISCHARGE SUMMARY
Discharge Summary    CHIEF COMPLAINT ON ADMISSION  Chief Complaint   Patient presents with   • Possible Stroke     brought in by ambulance transferred from [facility] for possible stroke. patient c/o dizziness and blurred vision x month but today having trouble walking due to severe dizziness. + nausea. right facial dropp and having hard time articulating. =  and strong.       Reason for Admission  Stroke     Admission Date  5/7/2020    CODE STATUS  Prior    HPI & HOSPITAL COURSE  This is a 74 y.o. female with past medical history of hypertension tobacco abuse and history of retinal hemorrhage of left eye presented to the hospital on May 7, 2020 with complaint of dizziness.  Due to concern for stroke she underwent MRI brain without contrast and it did not show any acute abnormalities.  She underwent CTA head and neck and she found to have evidence of left carotid stenosis 50 to 70%.  Vascular surgery Dr. Marie was consulted and she underwent left carotid endarterectomy on May 10, 2020.  Patient was evaluated by physical therapy and they recommended SNF placement and patient decided not to go to SNF and to discharge home.  Today I waited and examined her at the bedside she expressed that she would like to be discharged home and she declined skin nursing facility discharge along with that I offered her home health and she also declined home health.  Today on the day of discharge she denies any acute complaints and she feels ready to be discharged.  She found to have 3.5 mm aneurysm at the left MCA trifurcation and neuro interventional radiologist recommended 1 year CT angiogram of the head recommended to ensure stability.  I called the  to schedule an appointment with primary care provider as well as with vascular surgery.      Therefore, she is discharged in fair and stable condition to home with close outpatient follow-up.    The patient met 2-midnight criteria for an inpatient stay at  the time of discharge.    Discharge Date  5/12/2020    FOLLOW UP ITEMS POST DISCHARGE  Primary care provider  Vascular surgery   1 year CT angiogram of the head to ensure aneurysm stability    DISCHARGE DIAGNOSES  Principal Problem (Resolved):    Vertigo POA: Yes  Active Problems:    HTN (hypertension) POA: Yes    Carotid stenosis POA: Yes    HLD (hyperlipidemia) POA: Yes    Tobacco abuse POA: Yes    Intracranial aneurysm POA: Yes    Severe protein-calorie malnutrition (HCC) POA: Yes  Resolved Problems:    Hypokalemia POA: Yes      FOLLOW UP    Lino Oswald M.D.  [address] [facility] [id number]  [address]  [place] [id number]  [phone]      [facility]  called office and left a Voicemail requesting office to call june to schedule your appointment. If you do not hear from them please call to schedule. Thank you     [facility] Physician      Please call to schedule your hospital follow up with your Primary Care Physician when you return home . Thank you       MEDICATIONS ON DISCHARGE     Medication List      START taking these medications      Instructions   atorvastatin 40 MG Tabs  Commonly known as:  LIPITOR   Take 1 Tab by mouth every evening.  Dose:  40 mg     clopidogrel 75 MG Tabs  Start taking on:  May 13, 2020  Commonly known as:  PLAVIX   Take 1 Tab by mouth every day.  Dose:  75 mg        CONTINUE taking these medications      Instructions   losartan 50 MG Tabs  Commonly known as:  COZAAR   Take 50 mg by mouth every day.  Dose:  50 mg     metoprolol SR 25 MG Tb24  Commonly known as:  TOPROL XL   Take 25 mg by mouth every day.  Dose:  25 mg     verapamil 120 MG Tabs  Commonly known as:  ISOPTIN   Take 40 mg by mouth every day.  Dose:  40 mg            Allergies  Allergies   Allergen Reactions   • Dust Mite Extract    • Pollen Extract        DIET  No orders of the defined types were placed in this encounter.      ACTIVITY  As tolerated.  Weight bearing as tolerated    CONSULTATIONS  Vascular surgery  Neurology  Neuro  interventional radiologist    PROCEDURES  Left carotid endarterectomy    LABORATORY  Lab Results   Component Value Date    SODIUM 138 05/09/2020    POTASSIUM 4.0 05/09/2020    CHLORIDE 107 05/09/2020    CO2 22 05/09/2020    GLUCOSE 106 (H) 05/09/2020    BUN 15 05/09/2020    CREATININE 0.75 05/09/2020        Lab Results   Component Value Date    WBC 5.7 05/09/2020    HEMOGLOBIN 13.9 05/09/2020    HEMATOCRIT 41.4 05/09/2020    PLATELETCT 145 (L) 05/09/2020      MR-BRAIN-W/O   Final Result         1. Age-related cerebral atrophy.      2. Mild to moderate periventricular and juxtacortical white matter changes consistent with chronic microvascular ischemic gliosis.      3. Chronic left maxillary sinusitis.      EC-ECHOCARDIOGRAM COMPLETE W/O CONT   Final Result      US-CAROTID DOPPLER BILAT   Final Result      OUTSIDE IMAGES-CT HEAD   Final Result      DX-CHEST-PORTABLE (1 VIEW)   Final Result      No acute cardiopulmonary abnormality.      CT-CTA NECK WITH & W/O-POST PROCESSING   Final Result   Addendum 1 of 1   Neuro interventional radiology consult is advised for potential aneurysm    intervention.      Final      CT-CTA HEAD WITH & W/O-POST PROCESS   Final Result      1.  No large vessel occlusion or high-grade stenosis.   2.  A 3.5 mm aneurysm at the left MCA trifurcation.      CT-CEREBRAL PERFUSION ANALYSIS   Final Result      1.  Cerebral blood flow less than 30% likely representing completed infarct = 0 mL.      2.  T Max more than 6 seconds likely representing combination of completed infarct and ischemia = 0 mL.      3.  Mismatched volume likely representing ischemic brain/penumbra = 0 mL.      4.  Please note that the cerebral perfusion was performed on the limited brain tissue around the basal ganglia region. Infarct/ischemia outside the CT perfusion sections can be missed in this study.      CT-HEAD W/O   Final Result      1. No CT evidence of acute infarct, hemorrhage or mass.   2. Mild global parenchymal  atrophy. Chronic small vessel ischemic changes.            Total time of the discharge process exceeds 35 minutes.

## (undated) DEVICE — RESERVOIR SUCTION 100 CC - SILICONE (20EA/CA)

## (undated) DEVICE — DECANTER FLD BLS - (50/CA)

## (undated) DEVICE — SET EXTENSION WITH 2 PORTS (48EA/CA) ***PART #2C8610 IS A SUBSTITUTE*****

## (undated) DEVICE — CLIP SM INTNL HRZN TI ESCP LGT - (24EA/PK 25PK/BX)

## (undated) DEVICE — BLADE SURGICAL #11 - (50/BX)

## (undated) DEVICE — INTRAOP NEURO IN OR 1:1 PER 15 MIN

## (undated) DEVICE — ELECTRODE DUAL RETURN W/ CORD - (50/PK)

## (undated) DEVICE — SUTURE CV

## (undated) DEVICE — CHLORAPREP 26 ML APPLICATOR - ORANGE TINT(25/CA)

## (undated) DEVICE — KIT SURGIFLO W/OUT THROMBIN - (6EA/CA)

## (undated) DEVICE — SOD. CHL. INJ. 0.9% 250 ML - (36/CA 50CA/PF)

## (undated) DEVICE — DRAPE SURGICAL U 77X120 - (10/CA)

## (undated) DEVICE — CLIP MED INTNL HRZN TI ESCP - (25/BX)

## (undated) DEVICE — DRAIN J-VAC 7MM FLAT - (10EA/CA)

## (undated) DEVICE — NEPTUNE 4 PORT MANIFOLD - (20/PK)

## (undated) DEVICE — VESSELOOP MINI BLUE STERILE - SURG-I-LOOP (10EA/BX)

## (undated) DEVICE — TOWELS CLOTH SURGICAL - (4/PK 20PK/CA)

## (undated) DEVICE — SODIUM CHL IRRIGATION 0.9% 1000ML (12EA/CA)

## (undated) DEVICE — GOWN WARMING STANDARD FLEX - (30/CA)

## (undated) DEVICE — MASK ANESTHESIA ADULT  - (100/CA)

## (undated) DEVICE — ELECTRODE 850 FOAM ADHESIVE - HYDROGEL RADIOTRNSPRNT (50/PK)

## (undated) DEVICE — CANISTER SUCTION 3000ML MECHANICAL FILTER AUTO SHUTOFF MEDI-VAC NONSTERILE LF DISP  (40EA/CA)

## (undated) DEVICE — SUTURE 2-0 VICRYL PLUS CT-1 - 8 X 18 INCH(12/BX)

## (undated) DEVICE — SUCTION INSTRUMENT YANKAUER BULBOUS TIP W/O VENT (50EA/CA)

## (undated) DEVICE — SUTURE 2-0 ETHILON FS - (36/BX) 18 INCH

## (undated) DEVICE — GLOVE BIOGEL SZ 8 SURGICAL PF LTX - (50PR/BX 4BX/CA)

## (undated) DEVICE — SUTURE GENERAL

## (undated) DEVICE — WIPE INSTRUMENT MICROWIPE (20EA/SP)

## (undated) DEVICE — SHUNT CAROTID FLEXCEL 14.5CM - 5/BX

## (undated) DEVICE — STAPLER SKIN DISP - (6/BX 10BX/CA) VISISTAT

## (undated) DEVICE — PROTECTOR ULNA NERVE - (36PR/CA)

## (undated) DEVICE — HEAD HOLDER JUNIOR/ADULT

## (undated) DEVICE — SODIUM CHL. INJ. 0.9% 500ML (24EA/CA 50CA/PF)

## (undated) DEVICE — DRAPE MAGNETIC (INSTRA-MAG) - (30/CA)

## (undated) DEVICE — SPONGE GAUZESTER 4 X 4 4PLY - (128PK/CA)

## (undated) DEVICE — VESSELOOP MAXI BLUE STERILE- SURG-I-LOOP (10EA/BX)

## (undated) DEVICE — SUTURE 6-0 PROLENE BV-1 D/A 24 (36PK/BX)"

## (undated) DEVICE — Device

## (undated) DEVICE — SUTURE 3-0 SILK 12 X 18 IN - (36/BX)

## (undated) DEVICE — SUTURE 5-0 PROLENE BLUE C-1 HS 1 X 30 (36EA/BX)"

## (undated) DEVICE — PACK MINOR BASIN - (2EA/CA)

## (undated) DEVICE — SET LEADWIRE 5 LEAD BEDSIDE DISPOSABLE ECG (1SET OF 5/EA)

## (undated) DEVICE — SENSOR SPO2 NEO LNCS ADHESIVE (20/BX) SEE USER NOTES

## (undated) DEVICE — LACTATED RINGERS INJ 1000 ML - (14EA/CA 60CA/PF)

## (undated) DEVICE — SOD. CHL. INJ. 0.9% 1000 ML - (14EA/CA 60CA/PF)

## (undated) DEVICE — KIT ANESTHESIA W/CIRCUIT & 3/LT BAG W/FILTER (20EA/CA)

## (undated) DEVICE — TUBING CLEARLINK DUO-VENT - C-FLO (48EA/CA)

## (undated) DEVICE — DRESSING TRANSPARENT FILM TEGADERM 4 X 4.75" (50EA/BX)"

## (undated) DEVICE — SUTURE 4-0 30CM STRATAFIX SPIRAL PS-2 (12EA/BX)

## (undated) DEVICE — POLY UMBILICAL TAPE 1/8X30 - (36/BX)

## (undated) DEVICE — SLEEVE, VASO, THIGH, MED

## (undated) DEVICE — GLOVE BIOGEL SZ 6 PF LATEX - (50EA/BX 4BX/CA)

## (undated) DEVICE — GOWN SURGEONS LARGE - (32/CA)